# Patient Record
Sex: FEMALE | Race: BLACK OR AFRICAN AMERICAN | NOT HISPANIC OR LATINO | Employment: UNEMPLOYED | URBAN - METROPOLITAN AREA
[De-identification: names, ages, dates, MRNs, and addresses within clinical notes are randomized per-mention and may not be internally consistent; named-entity substitution may affect disease eponyms.]

---

## 2018-04-09 ENCOUNTER — HOSPITAL ENCOUNTER (OUTPATIENT)
Facility: HOSPITAL | Age: 23
Setting detail: OBSERVATION
Discharge: HOME | End: 2018-04-09
Attending: OBSTETRICS & GYNECOLOGY | Admitting: OBSTETRICS & GYNECOLOGY
Payer: COMMERCIAL

## 2018-04-09 VITALS
DIASTOLIC BLOOD PRESSURE: 77 MMHG | TEMPERATURE: 98.2 F | HEART RATE: 81 BPM | RESPIRATION RATE: 18 BRPM | SYSTOLIC BLOOD PRESSURE: 123 MMHG

## 2018-04-09 DIAGNOSIS — Z3A.39 39 WEEKS GESTATION OF PREGNANCY: Primary | ICD-10-CM

## 2018-04-09 PROBLEM — Z34.90 PREGNANCY: Status: ACTIVE | Noted: 2018-04-09

## 2018-04-09 PROCEDURE — 99218 PR INITIAL OBSERVATION CARE/DAY 30 MINUTES: CPT | Performed by: OBSTETRICS & GYNECOLOGY
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
## 2018-04-09 NOTE — DISCHARGE SUMMARY
Inpatient Discharge Summary    BRIEF OVERVIEW  Admitting Provider: Pauline Varner MD  Discharge Provider: Pauline Varner MD  Primary Care Physician at Discharge: No primary care provider on file. None    Admission Date: 4/9/2018     Discharge Date: 4/9/2018    Primary Discharge Diagnosis  39 weeks pregnant  Secondary Discharge Diagnosis  Not in labor, dehydration    Discharge Disposition  To Home  Discharge Medications     Medication List      No changes were made to your prescriptions during this visit.         Active Issues Requiring Follow-up  Issue: pregnancy  Responsible Individual: self  What is Needed: follow up appt this week  Follow-up Appointments Arranged: No     Outpatient Follow-Up  No future appointments.    Test Results Pending at Discharge      DETAILS OF HOSPITAL STAY    Presenting Problem/History of Present Illness  Pregnancy [Z34.90]  The patient presented with leg pain at 30-39 weeks pregnant.  She was not labor there was a reactive category 1 tracing and she was discharged home.    Hospital Course/Operative Procedures Performed    Consults: none  Procedures: none  Pertinent Test Results: reactive fetal tracing

## 2020-12-27 ENCOUNTER — APPOINTMENT (EMERGENCY)
Dept: RADIOLOGY | Facility: HOSPITAL | Age: 25
End: 2020-12-27

## 2020-12-27 ENCOUNTER — HOSPITAL ENCOUNTER (EMERGENCY)
Facility: HOSPITAL | Age: 25
Discharge: HOME | End: 2020-12-27
Attending: EMERGENCY MEDICINE

## 2020-12-27 VITALS
SYSTOLIC BLOOD PRESSURE: 150 MMHG | HEIGHT: 66 IN | HEART RATE: 83 BPM | DIASTOLIC BLOOD PRESSURE: 86 MMHG | OXYGEN SATURATION: 100 % | WEIGHT: 160 LBS | BODY MASS INDEX: 25.71 KG/M2 | RESPIRATION RATE: 18 BRPM | TEMPERATURE: 98.8 F
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
## 2020-12-27 DIAGNOSIS — M25.552 LEFT HIP PAIN: ICD-10-CM

## 2020-12-27 DIAGNOSIS — M25.561 ACUTE PAIN OF BOTH KNEES: ICD-10-CM

## 2020-12-27 DIAGNOSIS — T14.8XXA SKIN ABRASION: ICD-10-CM

## 2020-12-27 DIAGNOSIS — M25.531 RIGHT WRIST PAIN: ICD-10-CM

## 2020-12-27 DIAGNOSIS — M79.674 GREAT TOE PAIN, RIGHT: ICD-10-CM

## 2020-12-27 DIAGNOSIS — M25.562 ACUTE PAIN OF BOTH KNEES: ICD-10-CM

## 2020-12-27 PROCEDURE — 73564 X-RAY EXAM KNEE 4 OR MORE: CPT | Mod: 50

## 2020-12-27 PROCEDURE — 63700000 HC SELF-ADMINISTRABLE DRUG: Performed by: NURSE PRACTITIONER

## 2020-12-27 PROCEDURE — 73110 X-RAY EXAM OF WRIST: CPT | Mod: RT

## 2020-12-27 PROCEDURE — 73502 X-RAY EXAM HIP UNI 2-3 VIEWS: CPT | Mod: LT

## 2020-12-27 PROCEDURE — 73630 X-RAY EXAM OF FOOT: CPT | Mod: RT

## 2020-12-27 PROCEDURE — 99283 EMERGENCY DEPT VISIT LOW MDM: CPT | Mod: 25

## 2020-12-27 RX ORDER — IBUPROFEN 600 MG/1
600 TABLET ORAL ONCE
Status: COMPLETED | OUTPATIENT
Start: 2020-12-27 | End: 2020-12-27

## 2020-12-27 RX ORDER — ACETAMINOPHEN 325 MG/1
650 TABLET ORAL ONCE
Status: COMPLETED | OUTPATIENT
Start: 2020-12-27 | End: 2020-12-27

## 2020-12-27 RX ADMIN — IBUPROFEN 600 MG: 600 TABLET, FILM COATED ORAL at 15:51

## 2020-12-27 RX ADMIN — ACETAMINOPHEN 650 MG: 325 TABLET, FILM COATED ORAL at 14:11

## 2020-12-27 SDOH — HEALTH STABILITY: MENTAL HEALTH: HOW OFTEN DO YOU HAVE A DRINK CONTAINING ALCOHOL?: NEVER

## 2020-12-27 ASSESSMENT — ENCOUNTER SYMPTOMS
CONFUSION: 0
HEADACHES: 0
DIZZINESS: 0
SHORTNESS OF BREATH: 0
BRUISES/BLEEDS EASILY: 0
ABDOMINAL PAIN: 0
FEVER: 0
ARTHRALGIAS: 1
NAUSEA: 0
COUGH: 0
BACK PAIN: 0
VOMITING: 0
NECK PAIN: 0
WOUND: 1
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
## 2020-12-27 NOTE — DISCHARGE INSTRUCTIONS
Please ice any injured areas.  Wash your wounds with warm soap and water and pat dry.  You may apply antibiotic ointment on your wounds if desired.  There were no fractures noted on your imaging.  Please establish care with a family provider.  I gave you a list of family providers that are accepting new patients.    Please wear the postop shoe.  This will help provide comfort to your toe.  You may call the orthopedic doctor for follow-up of any joint pain.    Please take Tylenol for pain.    Expect to be more sore tomorrow as you rest overnight your muscles will tighten.  It is normal to have more aches and pains the next day.  Please follow-up with your family provider if you have any questions or concerns regarding these.    Please return to the emergency department with any new or worsening symptoms like fever, chest pain, shortness of breath, abnormal bruising or bleeding, abdominal pain, severe headaches or vomiting.      Please return immediately to the emergency department with any suicidal thoughts, safety concerns, home safety concerns, domestic safety concerns.

## 2021-07-12 ENCOUNTER — HOSPITAL ENCOUNTER (EMERGENCY)
Facility: HOSPITAL | Age: 26
Discharge: HOME | End: 2021-07-12
Attending: EMERGENCY MEDICINE

## 2021-07-12 ENCOUNTER — APPOINTMENT (EMERGENCY)
Dept: RADIOLOGY | Facility: HOSPITAL | Age: 26
End: 2021-07-12
Attending: EMERGENCY MEDICINE

## 2021-07-12 VITALS
OXYGEN SATURATION: 99 % | DIASTOLIC BLOOD PRESSURE: 86 MMHG | RESPIRATION RATE: 18 BRPM | WEIGHT: 170 LBS | HEART RATE: 68 BPM | SYSTOLIC BLOOD PRESSURE: 123 MMHG | TEMPERATURE: 98.2 F | HEIGHT: 66 IN | BODY MASS INDEX: 27.32 KG/M2

## 2021-07-12 DIAGNOSIS — T18.108A FOREIGN BODY IN ESOPHAGUS, INITIAL ENCOUNTER: Primary | ICD-10-CM

## 2021-07-12 PROCEDURE — 99283 EMERGENCY DEPT VISIT LOW MDM: CPT | Mod: 25

## 2021-07-12 PROCEDURE — 70360 X-RAY EXAM OF NECK: CPT

## 2021-07-12 PROCEDURE — 63700000 HC SELF-ADMINISTRABLE DRUG: Performed by: EMERGENCY MEDICINE

## 2021-07-12 PROCEDURE — 71045 X-RAY EXAM CHEST 1 VIEW: CPT

## 2021-07-12 PROCEDURE — 25000000 HC PHARMACY GENERAL: Performed by: EMERGENCY MEDICINE

## 2021-07-12 RX ORDER — LIDOCAINE HYDROCHLORIDE 20 MG/ML
10 SOLUTION OROPHARYNGEAL ONCE
Status: COMPLETED | OUTPATIENT
Start: 2021-07-12 | End: 2021-07-12

## 2021-07-12 RX ORDER — ALUMINUM HYDROXIDE, MAGNESIUM HYDROXIDE, AND SIMETHICONE 1200; 120; 1200 MG/30ML; MG/30ML; MG/30ML
30 SUSPENSION ORAL ONCE
Status: COMPLETED | OUTPATIENT
Start: 2021-07-12 | End: 2021-07-12

## 2021-07-12 RX ADMIN — ALUMINA, MAGNESIA, AND SIMETHICONE ORAL SUSPENSION REGULAR STRENGTH 30 ML: 1200; 1200; 120 SUSPENSION ORAL at 22:05

## 2021-07-12 RX ADMIN — LIDOCAINE HYDROCHLORIDE 10 ML: 20 SOLUTION ORAL; TOPICAL at 22:04

## 2021-07-12 ASSESSMENT — ENCOUNTER SYMPTOMS
ABDOMINAL PAIN: 0
BRUISES/BLEEDS EASILY: 0
COUGH: 0
COLOR CHANGE: 0
FATIGUE: 0
PALPITATIONS: 0
ARTHRALGIAS: 0
FREQUENCY: 0
SPEECH DIFFICULTY: 0
CHILLS: 0
NAUSEA: 0
RHINORRHEA: 0
HEMATURIA: 0
WEAKNESS: 0
CHEST TIGHTNESS: 0
NUMBNESS: 0
DIARRHEA: 0
WHEEZING: 0
EYE PAIN: 0
DYSURIA: 0
VOMITING: 0
SORE THROAT: 1
BACK PAIN: 0
SHORTNESS OF BREATH: 0
FEVER: 0

## 2021-07-13 NOTE — ED PROVIDER NOTES
Emergency Medicine Note  HPI   HISTORY OF PRESENT ILLNESS     26-year-old female presents with foreign body sensation.  Patient states she was eating in a restaurant swallowed [place] chicken meal.  Had discomfort with swallowing.  Looked through her food and saw metal from the wire brush for the grill and her chicken meat.  Patient presented for evaluation.      History provided by:  Patient   used: No    Swallowed Foreign Body  Location:  Oropharynx  Severity:  Mild  Onset quality:  Gradual  Timing:  Intermittent  Progression:  Waxing and waning  Chronicity:  New  Relieved by:  Nothing  Worsened by:  Swallowing  Associated symptoms: sore throat    Associated symptoms: no abdominal pain, no chest pain, no congestion, no cough, no diarrhea, no ear pain, no fatigue, no fever, no nausea, no rash, no rhinorrhea, no shortness of breath, no vomiting and no wheezing          Patient History   PAST HISTORY     Reviewed from Nursing Triage:      History reviewed. No pertinent past medical history.    History reviewed. No pertinent surgical history.    History reviewed. No pertinent family history.    Social History     Tobacco Use   • Smoking status: Never Smoker   • Smokeless tobacco: Never Used   Substance Use Topics   • Alcohol use: Never   • Drug use: Never         Review of Systems   REVIEW OF SYSTEMS     Review of Systems   Constitutional: Negative for chills, fatigue and fever.   HENT: Positive for sore throat. Negative for congestion, ear pain and rhinorrhea.    Eyes: Negative for pain and visual disturbance.   Respiratory: Negative for cough, chest tightness, shortness of breath and wheezing.    Cardiovascular: Negative for chest pain and palpitations.   Gastrointestinal: Negative for abdominal pain, diarrhea, nausea and vomiting.   Genitourinary: Negative for dysuria, frequency, hematuria, urgency, vaginal bleeding and vaginal discharge.   Musculoskeletal: Negative for arthralgias and back pain.    Skin: Negative for color change and rash.   Neurological: Negative for speech difficulty, weakness and numbness.   Hematological: Does not bruise/bleed easily.         VITALS     ED Vitals    Date/Time Temp Pulse Resp BP SpO2 [facility]   07/12/21 1917 36.8 °C (98.2 °F) 74 16 143/110 100 % JDG                       Physical Exam   PHYSICAL EXAM     Physical Exam  Vitals and nursing note reviewed.   Constitutional:       General: She is not in acute distress.     Appearance: She is well-developed.   HENT:      Head: Normocephalic and atraumatic.   Eyes:      Conjunctiva/sclera: Conjunctivae normal.   Neck:      Trachea: No tracheal deviation.   Cardiovascular:      Rate and Rhythm: Normal rate and regular rhythm.      Heart sounds: Normal heart sounds. No murmur heard.     Pulmonary:      Effort: Pulmonary effort is normal. No respiratory distress.      Breath sounds: Normal breath sounds.   Abdominal:      Palpations: Abdomen is soft.      Tenderness: There is no abdominal tenderness. There is no guarding or rebound.   Musculoskeletal:         General: Normal range of motion.      Cervical back: Neck supple.   Skin:     General: Skin is warm and dry.   Neurological:      Mental Status: She is alert.      GCS: GCS eye subscore is 4. GCS verbal subscore is 5. GCS motor subscore is 6.           PROCEDURES     Procedures     DATA     Results     None          Imaging Results          X-RAY CHEST 1 VIEW (Final result)  Result time 07/12/21 20:29:59    Final result                 Impression:    IMPRESSION:  No unexpected metallic or other radiopaque foreign body in the chest.             Narrative:    CLINICAL HISTORY: Foreign body, possibly swallowed metal bristle.    COMMENT: An upright PA view of the chest is submitted for interpretation.  There  are no prior studies for comparison.    There is no unexpected metallic or other radiopaque foreign body in the thorax  or visualized upper abdomen with the appearance of a metal  bristle.  The lungs  are clear bilaterally.  Lung volumes are normal.  There are no pleural  effusions.  No pneumothorax is seen.  Heart size is normal.  Cardiomediastinal  contours are satisfactory.  Regional osseous structures are unremarkable.                               X-RAY NECK SOFT TISSUE (Final result)  Result time 07/12/21 20:28:34    Final result                 Impression:    IMPRESSION:  No unexpected metallic or other radiopaque foreign body in the neck.             Narrative:    CLINICAL HISTORY: Evaluate for foreign body.  Possibly swallowed metal bristle.    COMMENT:    AP and lateral views of the neck soft tissues are submitted for interpretation.  There are no prior studies for comparison.    No metallic or other radiopaque foreign bodies identified in the neck soft  tissues.  The nasopharyngeal, oropharyngeal, and hypopharyngeal airways and the  visualized trachea are patent.  Prevertebral/retropharyngeal soft tissues are  normal in thickness.  There is nonspecific straightening of the cervical  lordosis.  Regional osseous structures are otherwise unremarkable.                                No orders to display       Scoring tools                                 ED Course & MDM   MDM / ED COURSE and CLINICAL IMPRESSIONS     MDM  Number of Diagnoses or Management Options  Diagnosis management comments: 26-year-old female with a foreign body sensation in her esophagus.  Patient feels some irritation when she swallows.  No persistent foreign body sensation.  X-ray soft tissue neck and chest negative for any retained foreign body.  Patient was reassured will discharge home will give follow-up with GI as an outpatient should symptoms worsen.       Amount and/or Complexity of Data Reviewed  Tests in the radiology section of CPT®: reviewed        ED Course as of Jul 12 2120   Mon Jul 12, 2021 2057 X-rays reviewed by radiology no foreign body.    [RL]      ED Course User Index  [RL] JESSICA Barrera,  MD         Clinical Impressions as of Jul 12 2120   Foreign body in esophagus, initial encounter            JESSICA Barrera MD  07/12/21 2056       JESSICA Barrera MD  07/12/21 2120

## 2021-11-24 ENCOUNTER — APPOINTMENT (EMERGENCY)
Dept: RADIOLOGY | Facility: HOSPITAL | Age: 26
End: 2021-11-24
Attending: EMERGENCY MEDICINE

## 2021-11-24 ENCOUNTER — HOSPITAL ENCOUNTER (EMERGENCY)
Facility: HOSPITAL | Age: 26
Discharge: LEFT BEFORE DISCHARGE | End: 2021-11-24
Attending: EMERGENCY MEDICINE

## 2021-11-24 VITALS
OXYGEN SATURATION: 100 % | SYSTOLIC BLOOD PRESSURE: 112 MMHG | RESPIRATION RATE: 18 BRPM | DIASTOLIC BLOOD PRESSURE: 78 MMHG | HEART RATE: 76 BPM | TEMPERATURE: 98.8 F

## 2021-11-24 DIAGNOSIS — Z34.90 EARLY STAGE OF PREGNANCY: Primary | ICD-10-CM

## 2021-11-24 DIAGNOSIS — O23.40 URINARY TRACT INFECTION IN MOTHER DURING PREGNANCY, ANTEPARTUM: ICD-10-CM

## 2021-11-24 LAB
ABO + RH BLD: NORMAL
ALBUMIN SERPL-MCNC: 4 G/DL (ref 3.4–5)
ALP SERPL-CCNC: 39 IU/L (ref 35–126)
ALT SERPL-CCNC: 10 IU/L (ref 11–54)
ANION GAP SERPL CALC-SCNC: 9 MEQ/L (ref 3–15)
AST SERPL-CCNC: 16 IU/L (ref 15–41)
B-HCG UR QL: POSITIVE
B-HCG UR QL: POSITIVE
BACTERIA URNS QL MICRO: ABNORMAL /HPF
BASOPHILS # BLD: 0.04 K/UL (ref 0.01–0.1)
BASOPHILS NFR BLD: 0.6 %
BILIRUB SERPL-MCNC: 0.4 MG/DL (ref 0.3–1.2)
BILIRUB UR QL STRIP.AUTO: NEGATIVE MG/DL
BLD GP AB SCN SERPL QL: NEGATIVE
BUN SERPL-MCNC: 11 MG/DL (ref 8–20)
CALCIUM SERPL-MCNC: 9.4 MG/DL (ref 8.9–10.3)
CHLORIDE SERPL-SCNC: 100 MEQ/L (ref 98–109)
CLARITY UR REFRACT.AUTO: ABNORMAL
CO2 SERPL-SCNC: 24 MEQ/L (ref 22–32)
COLOR UR AUTO: YELLOW
CREAT SERPL-MCNC: 0.6 MG/DL (ref 0.6–1.1)
D AG BLD QL: POSITIVE
DIFFERENTIAL METHOD BLD: NORMAL
EOSINOPHIL # BLD: 0.06 K/UL (ref 0.04–0.36)
EOSINOPHIL NFR BLD: 0.9 %
ERYTHROCYTE [DISTWIDTH] IN BLOOD BY AUTOMATED COUNT: 12.9 % (ref 11.7–14.4)
GFR SERPL CREATININE-BSD FRML MDRD: >60 ML/MIN/1.73M*2
GLUCOSE SERPL-MCNC: 89 MG/DL (ref 70–99)
GLUCOSE UR STRIP.AUTO-MCNC: NEGATIVE MG/DL
HCG SERPL-ACNC: ABNORMAL IU/L (MIU/ML)
HCT VFR BLDCO AUTO: 38.5 % (ref 35–45)
HGB BLD-MCNC: 12.5 G/DL (ref 11.8–15.7)
HGB UR QL STRIP.AUTO: NEGATIVE
HYALINE CASTS #/AREA URNS LPF: ABNORMAL /LPF
IMM GRANULOCYTES # BLD AUTO: 0.02 K/UL (ref 0–0.08)
IMM GRANULOCYTES NFR BLD AUTO: 0.3 %
KETONES UR STRIP.AUTO-MCNC: ABNORMAL MG/DL
LABORATORY COMMENT REPORT: NORMAL
LEUKOCYTE ESTERASE UR QL STRIP.AUTO: 1
LYMPHOCYTES # BLD: 3.28 K/UL (ref 1.2–3.5)
LYMPHOCYTES NFR BLD: 48 %
MCH RBC QN AUTO: 28.7 PG (ref 28–33.2)
MCHC RBC AUTO-ENTMCNC: 32.5 G/DL (ref 32.2–35.5)
MCV RBC AUTO: 88.3 FL (ref 83–98)
MONOCYTES # BLD: 0.57 K/UL (ref 0.28–0.8)
MONOCYTES NFR BLD: 8.3 %
NEUTROPHILS # BLD: 2.87 K/UL (ref 1.7–7)
NEUTS SEG NFR BLD: 41.9 %
NITRITE UR QL STRIP.AUTO: NEGATIVE
NRBC BLD-RTO: 0 %
PDW BLD AUTO: 10.6 FL (ref 9.4–12.3)
PH UR STRIP.AUTO: 7 [PH]
PLATELET # BLD AUTO: 297 K/UL (ref 150–369)
POCT TEST: ABNORMAL
POCT TEST: ABNORMAL
POTASSIUM SERPL-SCNC: 4.5 MEQ/L (ref 3.6–5.1)
PROT SERPL-MCNC: 7.7 G/DL (ref 6–8.2)
PROT UR QL STRIP.AUTO: NEGATIVE
RBC # BLD AUTO: 4.36 M/UL (ref 3.93–5.22)
RBC #/AREA URNS HPF: ABNORMAL /HPF
SODIUM SERPL-SCNC: 133 MEQ/L (ref 136–144)
SP GR UR REFRACT.AUTO: 1.02
SPECIMEN EXP DATE BLD: NORMAL
SQUAMOUS URNS QL MICRO: 2 /HPF
UROBILINOGEN UR STRIP-ACNC: 1 EU/DL
WBC # BLD AUTO: 6.84 K/UL (ref 3.8–10.5)
WBC #/AREA URNS HPF: ABNORMAL /HPF

## 2021-11-24 PROCEDURE — 85025 COMPLETE CBC W/AUTO DIFF WBC: CPT | Performed by: PHYSICIAN ASSISTANT

## 2021-11-24 PROCEDURE — 99284 EMERGENCY DEPT VISIT MOD MDM: CPT

## 2021-11-24 PROCEDURE — G1004 CDSM NDSC: HCPCS

## 2021-11-24 PROCEDURE — 80053 COMPREHEN METABOLIC PANEL: CPT | Performed by: PHYSICIAN ASSISTANT

## 2021-11-24 PROCEDURE — 84702 CHORIONIC GONADOTROPIN TEST: CPT | Performed by: PHYSICIAN ASSISTANT

## 2021-11-24 PROCEDURE — 87086 URINE CULTURE/COLONY COUNT: CPT

## 2021-11-24 PROCEDURE — 36415 COLL VENOUS BLD VENIPUNCTURE: CPT | Performed by: PHYSICIAN ASSISTANT

## 2021-11-24 PROCEDURE — 81001 URINALYSIS AUTO W/SCOPE: CPT

## 2021-11-24 PROCEDURE — 86900 BLOOD TYPING SEROLOGIC ABO: CPT

## 2021-11-24 PROCEDURE — 63700000 HC SELF-ADMINISTRABLE DRUG: Performed by: PHYSICIAN ASSISTANT

## 2021-11-24 PROCEDURE — 93005 ELECTROCARDIOGRAM TRACING: CPT | Performed by: PHYSICIAN ASSISTANT

## 2021-11-24 PROCEDURE — 3E033GC INTRODUCTION OF OTHER THERAPEUTIC SUBSTANCE INTO PERIPHERAL VEIN, PERCUTANEOUS APPROACH: ICD-10-PCS | Performed by: EMERGENCY MEDICINE

## 2021-11-24 RX ORDER — ONDANSETRON 4 MG/1
4 TABLET, ORALLY DISINTEGRATING ORAL EVERY 8 HOURS PRN
Qty: 12 TABLET | Refills: 0 | Status: SHIPPED | OUTPATIENT
Start: 2021-11-24 | End: 2022-03-03

## 2021-11-24 RX ORDER — DIPHENHYDRAMINE HCL 50 MG/ML
12.5 VIAL (ML) INJECTION ONCE
Status: DISCONTINUED | OUTPATIENT
Start: 2021-11-24 | End: 2021-11-24 | Stop reason: HOSPADM

## 2021-11-24 RX ORDER — METOCLOPRAMIDE HYDROCHLORIDE 5 MG/ML
10 INJECTION INTRAMUSCULAR; INTRAVENOUS ONCE
Status: DISCONTINUED | OUTPATIENT
Start: 2021-11-24 | End: 2021-11-24 | Stop reason: HOSPADM

## 2021-11-24 RX ORDER — ACETAMINOPHEN 325 MG/1
650 TABLET ORAL ONCE
Status: COMPLETED | OUTPATIENT
Start: 2021-11-24 | End: 2021-11-24

## 2021-11-24 RX ORDER — CEPHALEXIN 500 MG/1
500 CAPSULE ORAL 2 TIMES DAILY
Qty: 14 CAPSULE | Refills: 0 | Status: SHIPPED | OUTPATIENT
Start: 2021-11-24 | End: 2021-12-01

## 2021-11-24 RX ADMIN — ACETAMINOPHEN 650 MG: 325 TABLET ORAL at 20:40

## 2021-11-24 ASSESSMENT — ENCOUNTER SYMPTOMS
LIGHT-HEADEDNESS: 0
DIARRHEA: 0
CONFUSION: 0
FEVER: 0
NUMBNESS: 0
COLOR CHANGE: 0
DECREASED CONCENTRATION: 0
SORE THROAT: 0
SHORTNESS OF BREATH: 0
NAUSEA: 1
FACIAL SWELLING: 0
ABDOMINAL DISTENTION: 0
BRUISES/BLEEDS EASILY: 0
BACK PAIN: 0
SINUS PRESSURE: 0
COUGH: 0
FLANK PAIN: 0
STRIDOR: 0
WEAKNESS: 0
PHOTOPHOBIA: 1
CHEST TIGHTNESS: 0
CHILLS: 0
DIZZINESS: 1
PALPITATIONS: 0
EYE PAIN: 1
FREQUENCY: 0
TREMORS: 0
WHEEZING: 0
FATIGUE: 0
HEMATURIA: 0
JOINT SWELLING: 0
HEADACHES: 1
SINUS PAIN: 0
ABDOMINAL PAIN: 1
DIAPHORESIS: 0
ARTHRALGIAS: 0
DYSURIA: 0
DIFFICULTY URINATING: 0
RHINORRHEA: 0
MYALGIAS: 0
VOMITING: 0

## 2021-11-24 ASSESSMENT — VISUAL ACUITY: OU: 1

## 2021-11-25 LAB
ATRIAL RATE: 77
P AXIS: 62
PR INTERVAL: 158
QRS DURATION: 72
QT INTERVAL: 362
QTC CALCULATION(BAZETT): 409
R AXIS: 34
T WAVE AXIS: 44
VENTRICULAR RATE: 77

## 2021-11-25 NOTE — ED ATTESTATION NOTE
The patient was evaluated and managed by the physician assistant / nurse practitioner.     Brandan Interiano MD  11/24/21 0599

## 2021-11-25 NOTE — DISCHARGE INSTRUCTIONS
Keflex as prescribed   Zofran as prescribed as needed for nausea   Increase fluids, rest  OTC tylenol as needed  Follow up with PCP in 1-2 days  Follow up with OBGYN in 24 hours  Return to the ED for any increased pain, fevers, increased vomiting or diarrhea, unable to eat or drink, unable to urinate, or any worsening of symptoms. Follow up with your healthcare provider for re-evaluation.      Leaving Against Medical Advice    You are leaving the emergency department / hospital against medical advice.  The emergency medicine provider has recommended you stay for further evaluation, treatment, and monitoring.  As a result of your leaving, there may be unforeseen circumstances and/or deterioration causing you permanent bodily harm or death.  Please follow-up with your doctor for re-evaluation as soon as possible as well as any other physicians listed in your instructions.  Return to the emergency department immediately if you change your mind, your condition begins to change or worsen, or any other problems or concerns.

## 2021-11-25 NOTE — ED PROVIDER NOTES
"Emergency Medicine Note  HPI   HISTORY OF PRESENT ILLNESS     Patient is a pleasant 27 y/o female without significant PMH who presents c/o headache, dizziness, blurred vision, nausea, abdominal pain x several days  Notes she had 2 teeth extracted on Monday (11/22) in [place] at her dentist   States she had general anesthesia for this procedure  Notes upon waking, approx 1 hour later, diffuse headache  States headache is dull, aching, throbbing  States \"I can feel it when I even blink\"   Notes photophobia  States symptoms have been constant   States associated dizziness, nausea   Notes some diffuse abdominal pain as well   She denies fever, chills, sweats, diarrhea, chest pain, SOB  She denies neck pain, numbness/ tingling/ weakness in extremities  She denies any other complaints or concerns at this time             Patient History   PAST HISTORY     Reviewed from Nursing Triage:      History reviewed. No pertinent past medical history.    Past Surgical History:   Procedure Laterality Date   • COMBINED AUGMENTATION MAMMAPLASTY AND ABDOMINOPLASTY         History reviewed. No pertinent family history.    Social History     Tobacco Use   • Smoking status: Never Smoker   • Smokeless tobacco: Never Used   Substance Use Topics   • Alcohol use: Never   • Drug use: Never         Review of Systems   REVIEW OF SYSTEMS     Review of Systems   Constitutional: Negative for chills, diaphoresis, fatigue and fever.   HENT: Negative for congestion, dental problem, ear pain, facial swelling, postnasal drip, rhinorrhea, sinus pressure, sinus pain and sore throat.    Eyes: Positive for photophobia, pain and visual disturbance.   Respiratory: Negative for cough, chest tightness, shortness of breath, wheezing and stridor.    Cardiovascular: Negative for chest pain, palpitations and leg swelling.   Gastrointestinal: Positive for abdominal pain and nausea. Negative for abdominal distention, diarrhea and vomiting.   Genitourinary: " Negative for decreased urine volume, difficulty urinating, dysuria, flank pain, frequency, hematuria, pelvic pain and urgency.   Musculoskeletal: Negative for arthralgias, back pain, gait problem, joint swelling and myalgias.   Skin: Negative for color change and rash.   Neurological: Positive for dizziness and headaches. Negative for tremors, syncope, weakness, light-headedness and numbness.   Hematological: Does not bruise/bleed easily.   Psychiatric/Behavioral: Negative for confusion and decreased concentration.         VITALS     ED Vitals    Date/Time Temp Pulse Resp BP SpO2 [facility]   11/24/21 2100 -- -- 18 112/78 100 % [facility]   11/24/21 1943 -- 76 18 112/75 100 % [facility]   11/24/21 1649 37.1 °C (98.8 °F) 79 18 112/60 100 % [facility]        Pulse Ox %: 100 % (11/24/21 1952)  Pulse Ox Interpretation: Normal (11/24/21 1952)  Heart Rate: 77 (11/24/21 1952)  Rhythm Strip Interpretation: Normal Sinus Rhythm (11/24/21 1952)     Physical Exam   PHYSICAL EXAM     Physical Exam  Vitals and nursing note reviewed.   Constitutional:       General: She is awake. She is not in acute distress.     Appearance: Normal appearance. She is well-developed, well-groomed and normal weight. She is not ill-appearing, toxic-appearing or diaphoretic.   HENT:      Head: Normocephalic and atraumatic. No right periorbital erythema or left periorbital erythema.      Jaw: There is normal jaw occlusion. No trismus, tenderness, swelling, pain on movement or malocclusion.      Mouth/Throat:      Lips: Pink.      Mouth: Mucous membranes are moist.      Dentition: No gingival swelling, dental abscesses or gum lesions.      Pharynx: Oropharynx is clear. Uvula midline. No pharyngeal swelling, oropharyngeal exudate, posterior oropharyngeal erythema or uvula swelling.      Comments: Braces noted   Dental implant post noted tooth #9  No gingiva erythema, edema  No areas of fluctuance   No trismus  Patient tolerating secretions, speaking in complete sentences   Eyes:       General: Lids are normal. Vision grossly intact. Gaze aligned appropriately. No scleral icterus.     Extraocular Movements: Extraocular movements intact.      Conjunctiva/sclera: Conjunctivae normal.      Pupils: Pupils are equal, round, and reactive to light.   Neck:      Trachea: Trachea and phonation normal. No abnormal tracheal secretions or tracheal deviation.   Cardiovascular:      Rate and Rhythm: Normal rate and regular rhythm.      Heart sounds: Normal heart sounds.   Pulmonary:      Effort: Pulmonary effort is normal. No respiratory distress.      Breath sounds: Normal breath sounds.   Abdominal:      General: Abdomen is flat. Bowel sounds are normal.      Palpations: Abdomen is soft.      Tenderness: There is no abdominal tenderness.   Musculoskeletal:      Cervical back: Normal range of motion and neck supple.   Skin:     General: Skin is warm and dry.      Capillary Refill: Capillary refill takes less than 2 seconds.   Neurological:      General: No focal deficit present.      Mental Status: She is alert and oriented to person, place, and time.      GCS: GCS eye subscore is 4. GCS verbal subscore is 5. GCS motor subscore is 6.      Cranial Nerves: Cranial nerves are intact. No cranial nerve deficit.      Motor: Motor function is intact. No weakness, tremor or abnormal muscle tone.      Coordination: Coordination is intact. Coordination normal.      Gait: Gait is intact. Gait normal.   Psychiatric:         Mood and Affect: Mood normal. Mood is not anxious or depressed.         Behavior: Behavior normal. Behavior is cooperative.           PROCEDURES     Procedures     DATA     Results     Procedure Component Value Units Date/Time    CG, Serum, Quant [[id number]]  (Abnormal) Collected: 11/24/21 1936    Specimen: Blood, Venous Updated: 11/24/21 2111     hCG Quant 11,181.0 IU/L (mIU/mL)      Comment: Approx. Gestational Age   Approx. hCG Range         (Weeks)                  (IU/L)          0.2-1                     5-50            1-2                               2-3                  100-5000            3-4                  500-10,000            4-5                 1000-50,000            5-6               10,000-100,000            6-8               15,000-200,000            8-12              10,000-100,000       Comprehensive metabolic panel [[id number]]  (Abnormal) Collected: 11/24/21 1936    Specimen: Blood, Venous Updated: 11/24/21 2109     Sodium 133 mEQ/L      Potassium 4.5 mEQ/L      Comment: Results obtained on plasma. Plasma Potassium values may be up to 0.4 mEQ/L less than serum values. The differences may be greater for patients with high platelet or white cell counts.        Chloride 100 mEQ/L      CO2 24 mEQ/L      BUN 11 mg/dL      Creatinine 0.6 mg/dL      Glucose 89 mg/dL      Calcium 9.4 mg/dL      AST (SGOT) 16 IU/L      ALT (SGPT) 10 IU/L      Alkaline Phosphatase 39 IU/L      Total Protein 7.7 g/dL      Comment: Test performed on plasma which typically contains approximately 0.4 g/dL more protein than serum.        Albumin 4.0 g/dL      Bilirubin, Total 0.4 mg/dL      eGFR >60.0 mL/min/1.73m*2      Anion Gap 9 mEQ/L     Type and screen [[id number]] Collected: 11/24/21 1936    Specimen: Blood, Venous Updated: 11/24/21 2101     Specimen Expiration 11/27/2021     Antibody Screen Negative     ABO O     Rh Factor Positive     History Check No type on file    CBC and differential [[id number]] Collected: 11/24/21 1936    Specimen: Blood, Venous Updated: 11/24/21 2001     WBC 6.84 K/uL      RBC 4.36 M/uL      Hemoglobin 12.5 g/dL      Hematocrit 38.5 %      MCV 88.3 fL      MCH 28.7 pg      MCHC 32.5 g/dL      RDW 12.9 %      Platelets 297 K/uL      MPV 10.6 fL      Differential Type Auto     nRBC 0.0 %      Immature Granulocytes 0.3 %      Neutrophils 41.9 %      Lymphocytes 48.0 %      Monocytes 8.3 %      Eosinophils 0.9 %      Basophils 0.6 %      Immature Granulocytes, Absolute 0.02 K/uL       Neutrophils, Absolute 2.87 K/uL      Lymphocytes, Absolute 3.28 K/uL      Monocytes, Absolute 0.57 K/uL      Eosinophils, Absolute 0.06 K/uL      Basophils, Absolute 0.04 K/uL     Urinalysis with Reflex Culture [[id number]]  (Abnormal) Collected: 11/24/21 1652    Specimen: Urine, Clean Catch Updated: 11/24/21 1728    Narrative:      The following orders were created for panel order Urinalysis with Reflex Culture.  Procedure                               Abnormality         Status                     ---------                               -----------         ------                     UA Reflex to Culture (Ma...[[id number]]  Abnormal            Final result               UA Microscopic[[id number]]               Abnormal            Final result                 Please view results for these tests on the individual orders.    UA Microscopic [[id number]]  (Abnormal) Collected: 11/24/21 1652    Specimen: Urine, Clean Catch Updated: 11/24/21 1728     RBC, Urine 0 TO 4 /HPF      WBC, Urine 0 TO 3 /HPF      Squamous Epithelial +2 /hpf      Hyaline Cast None Seen /lpf      Bacteria, Urine Rare /HPF     UA Reflex to Culture (Macroscopic) [[id number]]  (Abnormal) Collected: 11/24/21 1652    Specimen: Urine, Clean Catch Updated: 11/24/21 1717     Color, Urine Yellow     Clarity, Urine Cloudy     Specific Gravity, Urine 1.024     pH, Urine 7.0     Leukocyte Esterase +1     Nitrite, Urine Negative     Protein, Urine Negative     Glucose, Urine Negative mg/dL      Ketones, Urine Trace mg/dL      Comment: Free sulfhydryl drugs such as Mesna, Capoten, and Acetylcysteine (Mucomyst) may cause false positive ketonuria.        Urobilinogen, Urine 1.0 EU/dL      Bilirubin, Urine Negative mg/dL      Blood, Urine Negative          Imaging Results          CT HEAD WITHOUT IV CONTRAST (In process)                 ECG 12 lead             Scoring tools                                 ED Course & MDM   MDM / ED COURSE and CLINICAL IMPRESSIONS      MDM    ED Course as of 21 POCT BhCG, Urine Qual(!): Positive  Noted  [CL]    Leukocyte Esterase(!): +1 [CL]    Nitrite, Urine: Negative [CL]    Temp: 37.1 °C (98.8 °F) [CL]    Patient states LMP possibly last month - is unsure and notes she is irregular. States , no OBGYN currently  [CL]    Patient declined IV medications at this time [CL]    Patient requesting IV be removed  [CL]    Patient states she needs to leave ASAP to  her 3 year old, will try to get her to [place] ASAP. Patient agreeable to leave AMA if needed  [CL]    Patient states she needs to leave to get her child. States she cannot stay for US, or CT results. Labs reviewed. Dr. Interiano aware. AMA paperwork completed [CL]    This patient is choosing to leave against medical advice.  The emergency provider (EP) has personally explained to her that choosing to do so may result in permanent bodily harm or death.  The EP discussed at great length that without further evaluation and monitoring there may be unforeseen circumstances and/or deterioration causing permanent bodily harm or death as a result of her choice.  She verbalized these risks back to the physician in layman's terms.  She is alert, oriented, and shows the mental capacity to make clear decisions regarding her health care at this time. She continues to wish to leave against medical advice.    In light of her decision to leave AMA, follow-up has been advised and she is aware of the importance of following up as instructed.  She has been advised that she should return to the ED immediately if she changes her mind at any time, or if her condition begins to change or worsen in any way. Discharge instructions were provided to the patient. [CL]      ED Course User Index  [CL] Barbara Krause PA C         Clinical Impressions as of 21   Urinary tract infection in mother during pregnancy, antepartum   Early  stage of pregnancy            Link, THERESA Allen  11/24/21 2129

## 2021-11-26 LAB — BACTERIA UR CULT: NORMAL

## 2022-03-03 PROCEDURE — 99283 EMERGENCY DEPT VISIT LOW MDM: CPT | Mod: 25

## 2022-03-04 ENCOUNTER — HOSPITAL ENCOUNTER (EMERGENCY)
Facility: HOSPITAL | Age: 27
Discharge: HOME | End: 2022-03-04
Attending: EMERGENCY MEDICINE

## 2022-03-04 VITALS
DIASTOLIC BLOOD PRESSURE: 68 MMHG | BODY MASS INDEX: 27.32 KG/M2 | RESPIRATION RATE: 18 BRPM | OXYGEN SATURATION: 99 % | HEIGHT: 66 IN | SYSTOLIC BLOOD PRESSURE: 90 MMHG | WEIGHT: 170 LBS | TEMPERATURE: 98.6 F | HEART RATE: 74 BPM

## 2022-03-04 DIAGNOSIS — B96.89 BACTERIAL VAGINOSIS IN PREGNANCY: Primary | ICD-10-CM

## 2022-03-04 DIAGNOSIS — O23.599 BACTERIAL VAGINOSIS IN PREGNANCY: Primary | ICD-10-CM

## 2022-03-04 LAB
ALBUMIN SERPL-MCNC: 3.9 G/DL (ref 3.4–5)
ALP SERPL-CCNC: 36 IU/L (ref 35–126)
ALT SERPL-CCNC: 12 IU/L (ref 11–54)
ANION GAP SERPL CALC-SCNC: 7 MEQ/L (ref 3–15)
AST SERPL-CCNC: 14 IU/L (ref 15–41)
B-HCG UR QL: POSITIVE
BACTERIA URNS QL MICRO: ABNORMAL /HPF
BASOPHILS # BLD: 0.03 K/UL (ref 0.01–0.1)
BASOPHILS NFR BLD: 0.5 %
BILIRUB SERPL-MCNC: 0.6 MG/DL (ref 0.3–1.2)
BILIRUB UR QL STRIP.AUTO: NEGATIVE MG/DL
BUN SERPL-MCNC: 11 MG/DL (ref 8–20)
BV WHIFF TEST VAG QL: ABNORMAL
C TRACH RRNA SPEC QL NAA+PROBE: NEGATIVE
CALCIUM SERPL-MCNC: 8.8 MG/DL (ref 8.9–10.3)
CHLORIDE SERPL-SCNC: 105 MEQ/L (ref 98–109)
CLARITY UR REFRACT.AUTO: CLEAR
CLUE CELLS SPEC QL WET PREP: ABNORMAL
CO2 SERPL-SCNC: 20 MEQ/L (ref 22–32)
COLOR UR AUTO: YELLOW
CREAT SERPL-MCNC: 0.5 MG/DL (ref 0.6–1.1)
DIFFERENTIAL METHOD BLD: NORMAL
EOSINOPHIL # BLD: 0.09 K/UL (ref 0.04–0.36)
EOSINOPHIL NFR BLD: 1.5 %
ERYTHROCYTE [DISTWIDTH] IN BLOOD BY AUTOMATED COUNT: 12.7 % (ref 11.7–14.4)
GFR SERPL CREATININE-BSD FRML MDRD: >60 ML/MIN/1.73M*2
GLUCOSE SERPL-MCNC: 97 MG/DL (ref 70–99)
GLUCOSE UR STRIP.AUTO-MCNC: NEGATIVE MG/DL
HCG SERPL-ACNC: 2979 IU/L (MIU/ML)
HCT VFR BLDCO AUTO: 36.2 % (ref 35–45)
HGB BLD-MCNC: 11.9 G/DL (ref 11.8–15.7)
HGB UR QL STRIP.AUTO: NEGATIVE
HYALINE CASTS #/AREA URNS LPF: ABNORMAL /LPF
IMM GRANULOCYTES # BLD AUTO: 0 K/UL (ref 0–0.08)
IMM GRANULOCYTES NFR BLD AUTO: 0 %
KETONES UR STRIP.AUTO-MCNC: NEGATIVE MG/DL
LEUKOCYTE ESTERASE UR QL STRIP.AUTO: NEGATIVE
LYMPHOCYTES # BLD: 3.41 K/UL (ref 1.2–3.5)
LYMPHOCYTES NFR BLD: 56.6 %
MCH RBC QN AUTO: 28.8 PG (ref 28–33.2)
MCHC RBC AUTO-ENTMCNC: 32.9 G/DL (ref 32.2–35.5)
MCV RBC AUTO: 87.7 FL (ref 83–98)
MONOCYTES # BLD: 0.48 K/UL (ref 0.28–0.8)
MONOCYTES NFR BLD: 8 %
MUCOUS THREADS URNS QL MICRO: ABNORMAL /LPF
N GONORRHOEA RRNA SPEC QL NAA+PROBE: NEGATIVE
NEUTROPHILS # BLD: 2.01 K/UL (ref 1.7–7)
NEUTS SEG NFR BLD: 33.4 %
NITRITE UR QL STRIP.AUTO: NEGATIVE
NRBC BLD-RTO: 0 %
PDW BLD AUTO: 10.5 FL (ref 9.4–12.3)
PH UR STRIP.AUTO: 6.5 [PH]
PLATELET # BLD AUTO: 244 K/UL (ref 150–369)
POCT TEST: ABNORMAL
POTASSIUM SERPL-SCNC: 3.7 MEQ/L (ref 3.6–5.1)
PROT SERPL-MCNC: 7.4 G/DL (ref 6–8.2)
PROT UR QL STRIP.AUTO: ABNORMAL
RBC # BLD AUTO: 4.13 M/UL (ref 3.93–5.22)
RBC #/AREA URNS HPF: ABNORMAL /HPF
SODIUM SERPL-SCNC: 132 MEQ/L (ref 136–144)
SP GR UR REFRACT.AUTO: 1.03
SQUAMOUS URNS QL MICRO: ABNORMAL /HPF
T VAGINALIS GENITAL QL WET PREP: ABNORMAL
UROBILINOGEN UR STRIP-ACNC: 2 EU/DL
WBC # BLD AUTO: 6.02 K/UL (ref 3.8–10.5)
WBC #/AREA URNS HPF: ABNORMAL /HPF
WBC VAG QL WET PREP: ABNORMAL
YEAST VAG QL WET PREP: ABNORMAL

## 2022-03-04 PROCEDURE — 87210 SMEAR WET MOUNT SALINE/INK: CPT | Performed by: EMERGENCY MEDICINE

## 2022-03-04 PROCEDURE — 84702 CHORIONIC GONADOTROPIN TEST: CPT | Performed by: EMERGENCY MEDICINE

## 2022-03-04 PROCEDURE — 36415 COLL VENOUS BLD VENIPUNCTURE: CPT | Performed by: EMERGENCY MEDICINE

## 2022-03-04 PROCEDURE — 80053 COMPREHEN METABOLIC PANEL: CPT | Performed by: EMERGENCY MEDICINE

## 2022-03-04 PROCEDURE — 63700000 HC SELF-ADMINISTRABLE DRUG: Performed by: EMERGENCY MEDICINE

## 2022-03-04 PROCEDURE — 81001 URINALYSIS AUTO W/SCOPE: CPT | Performed by: EMERGENCY MEDICINE

## 2022-03-04 PROCEDURE — 85025 COMPLETE CBC W/AUTO DIFF WBC: CPT | Performed by: EMERGENCY MEDICINE

## 2022-03-04 PROCEDURE — 87591 N.GONORRHOEAE DNA AMP PROB: CPT | Performed by: EMERGENCY MEDICINE

## 2022-03-04 PROCEDURE — 81003 URINALYSIS AUTO W/O SCOPE: CPT | Performed by: EMERGENCY MEDICINE

## 2022-03-04 RX ORDER — METRONIDAZOLE 500 MG/1
500 TABLET ORAL ONCE
Status: COMPLETED | OUTPATIENT
Start: 2022-03-04 | End: 2022-03-04

## 2022-03-04 RX ORDER — METRONIDAZOLE 500 MG/1
500 TABLET ORAL 2 TIMES DAILY
Qty: 14 TABLET | Refills: 0 | Status: SHIPPED | OUTPATIENT
Start: 2022-03-04 | End: 2022-03-11

## 2022-03-04 RX ADMIN — METRONIDAZOLE 500 MG: 500 TABLET ORAL at 04:43

## 2022-03-04 ASSESSMENT — ENCOUNTER SYMPTOMS
CONSTIPATION: 0
DIARRHEA: 0
DYSURIA: 0
LIGHT-HEADEDNESS: 0
VOMITING: 0
COUGH: 0
RHINORRHEA: 0
DIFFICULTY URINATING: 0
HEADACHES: 0
DIZZINESS: 0
FEVER: 0
FATIGUE: 0
CONFUSION: 0
ABDOMINAL PAIN: 1
CHILLS: 0
SHORTNESS OF BREATH: 0
EYE PAIN: 0
ARTHRALGIAS: 0
NECK PAIN: 0
BACK PAIN: 0
WEAKNESS: 0
NAUSEA: 0
FREQUENCY: 0

## 2022-03-04 NOTE — DISCHARGE INSTRUCTIONS
Take antibiotics as prescribed  Follow with your regular doctor/ob-gyn  Return for worsening or other concerns.

## 2022-03-04 NOTE — ED PROVIDER NOTES
Emergency Medicine Note  HPI   HISTORY OF PRESENT ILLNESS     Pt here complaining of pain in her lower abdomen  - started Wednesday - comes and goes.  Described as a cramping and burning type pain.  Pt denies associated symptoms.  She does report vaginal discharge  - grayish-white.  Pt denies itching or burning.  Pt has had BV off and on in the past- last treated a few months ago.  Pt denies nausea and vomiting.  No trouble with BM.  FDLMP 22.   Currently pregnant.  ;  Pt had no complications with pregnancy.  Pt does not have an Ob or a scheduled appointment.                  Patient History   PAST HISTORY     Reviewed from Nursing Triage:       No past medical history on file.    Past Surgical History:   Procedure Laterality Date   • COMBINED AUGMENTATION MAMMAPLASTY AND ABDOMINOPLASTY         No family history on file.    Social History     Tobacco Use   • Smoking status: Never Smoker   • Smokeless tobacco: Never Used   Substance Use Topics   • Alcohol use: Never   • Drug use: Never         Review of Systems   REVIEW OF SYSTEMS     Review of Systems   Constitutional: Negative for chills, fatigue and fever.   HENT: Negative for congestion, rhinorrhea and sneezing.    Eyes: Negative for pain.   Respiratory: Negative for cough and shortness of breath.    Cardiovascular: Negative for chest pain and leg swelling.   Gastrointestinal: Positive for abdominal pain. Negative for constipation, diarrhea, nausea and vomiting.   Genitourinary: Positive for vaginal discharge. Negative for difficulty urinating, dysuria and frequency.   Musculoskeletal: Negative for arthralgias, back pain and neck pain.   Skin: Negative for rash.   Neurological: Negative for dizziness, weakness, light-headedness and headaches.   Psychiatric/Behavioral: Negative for confusion.         VITALS     ED Vitals    Date/Time Temp Pulse Resp BP SpO2 [facility]   22 0446 -- -- 18 90/68 99 % [facility]   22 0214 -- -- -- -- 100 % [facility]   22  -- -- 15 110/67 100 % [facility]   03/03/22 2205 37 °C (98.6 °F) 74 16 117/64 100 % [facility]        Pulse Ox %: 100 % (03/04/22 0243)  Pulse Ox Interpretation: Normal (03/04/22 0243)  Heart Rate: 74 (03/04/22 0243)        Physical Exam   PHYSICAL EXAM     Physical Exam  Vitals and nursing note reviewed.   Constitutional:       Appearance: She is well-developed.   HENT:      Head: Normocephalic.      Nose: Nose normal.   Eyes:      Conjunctiva/sclera: Conjunctivae normal.      Pupils: Pupils are equal, round, and reactive to light.   Cardiovascular:      Rate and Rhythm: Normal rate and regular rhythm.      Heart sounds: Normal heart sounds.   Pulmonary:      Effort: Pulmonary effort is normal.      Breath sounds: Normal breath sounds.   Abdominal:      General: Bowel sounds are normal.      Palpations: Abdomen is soft.   Genitourinary:     Vagina: Normal.      Cervix: Discharge present.   Musculoskeletal:         General: Normal range of motion.      Cervical back: Normal range of motion and neck supple.   Lymphadenopathy:      Cervical: No cervical adenopathy.   Skin:     General: Skin is warm and dry.      Capillary Refill: Capillary refill takes less than 2 seconds.   Neurological:      General: No focal deficit present.      Mental Status: She is alert and oriented to person, place, and time.      Sensory: No sensory deficit.   Psychiatric:         Mood and Affect: Mood normal.         Behavior: Behavior normal.           PROCEDURES     Procedures     DATA     Results     Procedure Component Value Units Date/Time    BhCG, Serum, Quant [[id number]]  (Abnormal) Collected: 03/04/22 0320    Specimen: Blood, Venous Updated: 03/04/22 0535     hCG Quant 2,979.0 IU/L (mIU/mL)      Comment: Approx. Gestational Age   Approx. hCG Range         (Weeks)                  (IU/L)          0.2-1                    5-50            1-2                               2-3                  100-5000            3-4                   500-10,000            4-5                 1000-50,000            5-6               10,000-100,000            6-8               15,000-200,000            8-12              10,000-100,000       Wet prep, genital Vagina [[id number]]  (Abnormal) Collected: 03/04/22 0314    Specimen: Vaginal Swab Updated: 03/04/22 0407     WBC, Wet Prep 1+     Clue Cells, Wet Prep 3+     Yeast, Wet Prep None Seen     Trich, Wet Prep None Seen     Whiff Test Fishy odor detected when specimen mixed with KOH     Comment: A Wet Prep test should not be used as the sole method for detection of bacterial vaginosis.        Comprehensive metabolic panel [[id number]]  (Abnormal) Collected: 03/04/22 0320    Specimen: Blood, Venous Updated: 03/04/22 0348     Sodium 132 mEQ/L      Potassium 3.7 mEQ/L      Comment: Results obtained on plasma. Plasma Potassium values may be up to 0.4 mEQ/L less than serum values. The differences may be greater for patients with high platelet or white cell counts.        Chloride 105 mEQ/L      CO2 20 mEQ/L      BUN 11 mg/dL      Creatinine 0.5 mg/dL      Glucose 97 mg/dL      Calcium 8.8 mg/dL      AST (SGOT) 14 IU/L      ALT (SGPT) 12 IU/L      Alkaline Phosphatase 36 IU/L      Total Protein 7.4 g/dL      Comment: Test performed on plasma which typically contains approximately 0.4 g/dL more protein than serum.        Albumin 3.9 g/dL      Bilirubin, Total 0.6 mg/dL      eGFR >60.0 mL/min/1.73m*2      Anion Gap 7 mEQ/L     CBC and differential [[id number]] Collected: 03/04/22 0320    Specimen: Blood, Venous Updated: 03/04/22 0329     WBC 6.02 K/uL      RBC 4.13 M/uL      Hemoglobin 11.9 g/dL      Hematocrit 36.2 %      MCV 87.7 fL      MCH 28.8 pg      MCHC 32.9 g/dL      RDW 12.7 %      Platelets 244 K/uL      MPV 10.5 fL      Differential Type Auto     nRBC 0.0 %      Immature Granulocytes 0.0 %      Neutrophils 33.4 %      Lymphocytes 56.6 %      Monocytes 8.0 %      Eosinophils 1.5 %      Basophils 0.5 %      Immature  Granulocytes, Absolute 0.00 K/uL      Neutrophils, Absolute 2.01 K/uL      Lymphocytes, Absolute 3.41 K/uL      Monocytes, Absolute 0.48 K/uL      Eosinophils, Absolute 0.09 K/uL      Basophils, Absolute 0.03 K/uL     Chlamydia/GC RNA:ThinPrep/Urine/Swab Cervical [[id number]] Collected: 03/04/22 0313    Specimen: Swab from Cervical Updated: 03/04/22 0325    UA with reflex culture [[id number]]  (Abnormal) Collected: 03/04/22 0214    Specimen: Urine, Clean Catch Updated: 03/04/22 0240    Narrative:      The following orders were created for panel order UA with reflex culture.  Procedure                               Abnormality         Status                     ---------                               -----------         ------                     UA Reflex to Culture (Ma...[[id number]]  Abnormal            Final result               UA Microscopic[[id number]]               Abnormal            Final result                 Please view results for these tests on the individual orders.    UA Microscopic [[id number]]  (Abnormal) Collected: 03/04/22 0214    Specimen: Urine, Clean Catch Updated: 03/04/22 0240     RBC, Urine 0 TO 4 /HPF      WBC, Urine 0 TO 3 /HPF      Squamous Epithelial Rare /hpf      Hyaline Cast None Seen /lpf      Bacteria, Urine Rare /HPF      MUCUSUA Rare /LPF     UA Reflex to Culture (Macroscopic) [[id number]]  (Abnormal) Collected: 03/04/22 0214    Specimen: Urine, Clean Catch Updated: 03/04/22 0229     Color, Urine Yellow     Clarity, Urine Clear     Specific Gravity, Urine 1.033     pH, Urine 6.5     Leukocyte Esterase Negative     Comment: Results can be falsely negative due to high specific gravity, some antibiotics, glucose >3 g/dl, or WBC other than neutrophils.        Nitrite, Urine Negative     Protein, Urine Trace     Comment: Trace False Positive Protein can be seen with alkaline or highly buffered urines or urine with high specific gravity. Suggest clinical correlation.        Glucose, Urine  Negative mg/dL      Ketones, Urine Negative mg/dL      Urobilinogen, Urine 2.0 EU/dL      Bilirubin, Urine Negative mg/dL      Blood, Urine Negative     Comment: The sensitivity of the occult blood test is equivalent to approximately 4 intact RBC/HPF.             Imaging Results    None         No orders to display       Scoring tools                                 ED Course & MDM   MDM / ED COURSE / CLINICAL IMPRESSIONS / DISPO     MDM  Number of Diagnoses or Management Options      ED Course as of 03/04/22 0751   Fri Mar 04, 2022   0750 Pt with likely BV on wet prep.  Plan to treat with PO Flagyl and dc to home.  Pt encouraged to follow up as an outpatient.   [RP]      ED Course User Index  [RP] Gisell Miner DO         Clinical Impressions as of 03/04/22 0751   Bacterial vaginosis in pregnancy              Gisell Miner DO  03/04/22 0751

## 2022-03-07 ENCOUNTER — APPOINTMENT (RX ONLY)
Dept: URBAN - METROPOLITAN AREA CLINIC 374 | Facility: CLINIC | Age: 27
Setting detail: DERMATOLOGY
End: 2022-03-07

## 2022-03-07 DIAGNOSIS — L65.8 OTHER SPECIFIED NONSCARRING HAIR LOSS: ICD-10-CM | Status: INADEQUATELY CONTROLLED

## 2022-03-07 PROCEDURE — ? PHOTO-DOCUMENTATION

## 2022-03-07 PROCEDURE — ? DEFER

## 2022-03-07 PROCEDURE — ? COUNSELING

## 2022-03-07 PROCEDURE — 99202 OFFICE O/P NEW SF 15 MIN: CPT

## 2022-03-07 PROCEDURE — ? ADDITIONAL NOTES

## 2022-03-07 ASSESSMENT — LOCATION SIMPLE DESCRIPTION DERM
LOCATION SIMPLE: RIGHT SCALP
LOCATION SIMPLE: LEFT SCALP

## 2022-03-07 ASSESSMENT — LOCATION ZONE DERM: LOCATION ZONE: SCALP

## 2022-03-07 ASSESSMENT — LOCATION DETAILED DESCRIPTION DERM
LOCATION DETAILED: LEFT CENTRAL FRONTAL SCALP
LOCATION DETAILED: RIGHT CENTRAL FRONTAL SCALP

## 2022-03-07 NOTE — HPI: HAIR LOSS
Previous Labs: No
How Did The Hair Loss Occur?: gradual in onset
How Severe Is Your Hair Loss?: mild
Additional History: Patient will be having a hair transplant and patient needed a diagnosis prior to surgery.

## 2022-03-07 NOTE — PROCEDURE: ADDITIONAL NOTES
Additional Notes: Patient will be having a hair transplant at [facility] Additional Notes: Patient will be having a hair transplant at [facility]

## 2022-07-21 ENCOUNTER — HOSPITAL ENCOUNTER (EMERGENCY)
Facility: HOSPITAL | Age: 27
Discharge: LEFT BEFORE DISCHARGE | End: 2022-07-22
Payer: COMMERCIAL

## 2022-07-21 ENCOUNTER — HOSPITAL ENCOUNTER (OUTPATIENT)
Facility: HOSPITAL | Age: 27
Discharge: STILL ADMITTED OR ADMINISTRATIVE | End: 2022-07-21
Attending: OBSTETRICS & GYNECOLOGY | Admitting: OBSTETRICS & GYNECOLOGY
Payer: COMMERCIAL

## 2022-07-21 VITALS
RESPIRATION RATE: 19 BRPM | WEIGHT: 180 LBS | OXYGEN SATURATION: 99 % | SYSTOLIC BLOOD PRESSURE: 98 MMHG | TEMPERATURE: 98.5 F | HEART RATE: 127 BPM | BODY MASS INDEX: 28.93 KG/M2 | HEIGHT: 66 IN | DIASTOLIC BLOOD PRESSURE: 63 MMHG
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
## 2023-06-22 ENCOUNTER — APPOINTMENT (EMERGENCY)
Dept: RADIOLOGY | Facility: HOSPITAL | Age: 28
End: 2023-06-22
Payer: COMMERCIAL

## 2023-06-22 ENCOUNTER — HOSPITAL ENCOUNTER (EMERGENCY)
Facility: HOSPITAL | Age: 28
Discharge: HOME | End: 2023-06-22
Attending: EMERGENCY MEDICINE
Payer: COMMERCIAL

## 2023-06-22 VITALS
SYSTOLIC BLOOD PRESSURE: 141 MMHG | BODY MASS INDEX: 28.93 KG/M2 | TEMPERATURE: 98.3 F | HEIGHT: 66 IN | OXYGEN SATURATION: 100 % | RESPIRATION RATE: 18 BRPM | DIASTOLIC BLOOD PRESSURE: 90 MMHG | HEART RATE: 79 BPM | WEIGHT: 180 LBS

## 2023-06-22 DIAGNOSIS — N73.0 PID (ACUTE PELVIC INFLAMMATORY DISEASE): Primary | ICD-10-CM

## 2023-06-22 LAB
ALBUMIN SERPL-MCNC: 3.9 G/DL (ref 3.4–5)
ALP SERPL-CCNC: 55 IU/L (ref 35–126)
ALT SERPL-CCNC: 12 IU/L (ref 11–54)
ANION GAP SERPL CALC-SCNC: 7 MEQ/L (ref 3–15)
AST SERPL-CCNC: 15 IU/L (ref 15–41)
B-HCG UR QL: NEGATIVE
BACTERIA URNS QL MICRO: ABNORMAL /HPF
BASOPHILS # BLD: 0.03 K/UL (ref 0.01–0.1)
BASOPHILS NFR BLD: 0.4 %
BILIRUB SERPL-MCNC: 0.3 MG/DL (ref 0.3–1.2)
BILIRUB UR QL STRIP.AUTO: NEGATIVE MG/DL
BUN SERPL-MCNC: 14 MG/DL (ref 8–20)
BV WHIFF TEST VAG QL: ABNORMAL
CALCIUM SERPL-MCNC: 9 MG/DL (ref 8.9–10.3)
CHLORIDE SERPL-SCNC: 104 MEQ/L (ref 98–109)
CLARITY UR REFRACT.AUTO: CLEAR
CLUE CELLS SPEC QL WET PREP: ABNORMAL
CO2 SERPL-SCNC: 26 MEQ/L (ref 22–32)
COLOR UR AUTO: YELLOW
CREAT SERPL-MCNC: 0.6 MG/DL (ref 0.6–1.1)
DIFFERENTIAL METHOD BLD: ABNORMAL
EOSINOPHIL # BLD: 0.06 K/UL (ref 0.04–0.36)
EOSINOPHIL NFR BLD: 0.9 %
ERYTHROCYTE [DISTWIDTH] IN BLOOD BY AUTOMATED COUNT: 13.2 % (ref 11.7–14.4)
GFR SERPL CREATININE-BSD FRML MDRD: >60 ML/MIN/1.73M*2
GLUCOSE SERPL-MCNC: 95 MG/DL (ref 70–99)
GLUCOSE UR STRIP.AUTO-MCNC: NEGATIVE MG/DL
HCT VFR BLDCO AUTO: 38.6 % (ref 35–45)
HGB BLD-MCNC: 12.6 G/DL (ref 11.8–15.7)
HGB UR QL STRIP.AUTO: NEGATIVE
HYALINE CASTS #/AREA URNS LPF: ABNORMAL /LPF
IMM GRANULOCYTES # BLD AUTO: 0.01 K/UL (ref 0–0.08)
IMM GRANULOCYTES NFR BLD AUTO: 0.1 %
KETONES UR STRIP.AUTO-MCNC: NEGATIVE MG/DL
LEUKOCYTE ESTERASE UR QL STRIP.AUTO: 2
LYMPHOCYTES # BLD: 2.77 K/UL (ref 1.2–3.5)
LYMPHOCYTES NFR BLD: 40.2 %
MCH RBC QN AUTO: 27.6 PG (ref 28–33.2)
MCHC RBC AUTO-ENTMCNC: 32.6 G/DL (ref 32.2–35.5)
MCV RBC AUTO: 84.6 FL (ref 83–98)
MONOCYTES # BLD: 0.59 K/UL (ref 0.28–0.8)
MONOCYTES NFR BLD: 8.6 %
MUCOUS THREADS URNS QL MICRO: ABNORMAL /LPF
NEUTROPHILS # BLD: 3.43 K/UL (ref 1.7–7)
NEUTS SEG NFR BLD: 49.8 %
NITRITE UR QL STRIP.AUTO: NEGATIVE
NRBC BLD-RTO: 0 %
PDW BLD AUTO: 11.1 FL (ref 9.4–12.3)
PH UR STRIP.AUTO: 6 [PH]
PLATELET # BLD AUTO: 253 K/UL (ref 150–369)
POCT TEST: NORMAL
POTASSIUM SERPL-SCNC: 3.7 MEQ/L (ref 3.6–5.1)
PROT SERPL-MCNC: 7.7 G/DL (ref 6–8.2)
PROT UR QL STRIP.AUTO: NEGATIVE
RBC # BLD AUTO: 4.56 M/UL (ref 3.93–5.22)
RBC #/AREA URNS HPF: ABNORMAL /HPF
SODIUM SERPL-SCNC: 137 MEQ/L (ref 136–144)
SP GR UR REFRACT.AUTO: 1.02
SQUAMOUS URNS QL MICRO: 2 /HPF
T VAGINALIS GENITAL QL WET PREP: ABNORMAL
UROBILINOGEN UR STRIP-ACNC: 0.2 EU/DL
WBC # BLD AUTO: 6.89 K/UL (ref 3.8–10.5)
WBC #/AREA URNS HPF: ABNORMAL /HPF
WBC VAG QL WET PREP: ABNORMAL
YEAST VAG QL WET PREP: ABNORMAL

## 2023-06-22 PROCEDURE — 80053 COMPREHEN METABOLIC PANEL: CPT | Performed by: EMERGENCY MEDICINE

## 2023-06-22 PROCEDURE — 93975 VASCULAR STUDY: CPT

## 2023-06-22 PROCEDURE — 96374 THER/PROPH/DIAG INJ IV PUSH: CPT

## 2023-06-22 PROCEDURE — 3E02329 INTRODUCTION OF OTHER ANTI-INFECTIVE INTO MUSCLE, PERCUTANEOUS APPROACH: ICD-10-PCS | Performed by: EMERGENCY MEDICINE

## 2023-06-22 PROCEDURE — 87210 SMEAR WET MOUNT SALINE/INK: CPT | Performed by: PHYSICIAN ASSISTANT

## 2023-06-22 PROCEDURE — 87491 CHLMYD TRACH DNA AMP PROBE: CPT | Performed by: PHYSICIAN ASSISTANT

## 2023-06-22 PROCEDURE — 87591 N.GONORRHOEAE DNA AMP PROB: CPT | Performed by: PHYSICIAN ASSISTANT

## 2023-06-22 PROCEDURE — 81001 URINALYSIS AUTO W/SCOPE: CPT | Performed by: EMERGENCY MEDICINE

## 2023-06-22 PROCEDURE — 85025 COMPLETE CBC W/AUTO DIFF WBC: CPT

## 2023-06-22 PROCEDURE — 36415 COLL VENOUS BLD VENIPUNCTURE: CPT

## 2023-06-22 PROCEDURE — 81003 URINALYSIS AUTO W/O SCOPE: CPT | Performed by: EMERGENCY MEDICINE

## 2023-06-22 PROCEDURE — 99284 EMERGENCY DEPT VISIT MOD MDM: CPT | Mod: 25

## 2023-06-22 PROCEDURE — 25800000 HC PHARMACY IV SOLUTIONS: Performed by: PHYSICIAN ASSISTANT

## 2023-06-22 PROCEDURE — 85025 COMPLETE CBC W/AUTO DIFF WBC: CPT | Performed by: EMERGENCY MEDICINE

## 2023-06-22 PROCEDURE — 25000000 HC PHARMACY GENERAL: Performed by: PHYSICIAN ASSISTANT

## 2023-06-22 PROCEDURE — 3E0337Z INTRODUCTION OF ELECTROLYTIC AND WATER BALANCE SUBSTANCE INTO PERIPHERAL VEIN, PERCUTANEOUS APPROACH: ICD-10-PCS | Performed by: EMERGENCY MEDICINE

## 2023-06-22 PROCEDURE — 96372 THER/PROPH/DIAG INJ SC/IM: CPT | Mod: 25

## 2023-06-22 PROCEDURE — 76830 TRANSVAGINAL US NON-OB: CPT

## 2023-06-22 PROCEDURE — 76857 US EXAM PELVIC LIMITED: CPT

## 2023-06-22 PROCEDURE — 63600000 HC DRUGS/DETAIL CODE: Mod: JZ | Performed by: PHYSICIAN ASSISTANT

## 2023-06-22 PROCEDURE — 3E0333Z INTRODUCTION OF ANTI-INFLAMMATORY INTO PERIPHERAL VEIN, PERCUTANEOUS APPROACH: ICD-10-PCS | Performed by: EMERGENCY MEDICINE

## 2023-06-22 RX ORDER — METRONIDAZOLE 500 MG/1
500 TABLET ORAL 2 TIMES DAILY
Qty: 28 TABLET | Refills: 0 | Status: SHIPPED | OUTPATIENT
Start: 2023-06-22 | End: 2023-07-06

## 2023-06-22 RX ORDER — KETOROLAC TROMETHAMINE 30 MG/ML
30 INJECTION, SOLUTION INTRAMUSCULAR; INTRAVENOUS ONCE
Status: COMPLETED | OUTPATIENT
Start: 2023-06-22 | End: 2023-06-22

## 2023-06-22 RX ORDER — DOXYCYCLINE 100 MG/1
100 CAPSULE ORAL 2 TIMES DAILY
Qty: 28 CAPSULE | Refills: 0 | Status: SHIPPED | OUTPATIENT
Start: 2023-06-22 | End: 2023-07-06

## 2023-06-22 RX ADMIN — KETOROLAC TROMETHAMINE 30 MG: 30 INJECTION, SOLUTION INTRAMUSCULAR at 10:47

## 2023-06-22 RX ADMIN — CEFTRIAXONE SODIUM 500 MG: 500 INJECTION, POWDER, FOR SOLUTION INTRAMUSCULAR; INTRAVENOUS at 13:04

## 2023-06-22 RX ADMIN — SODIUM CHLORIDE 1000 ML: 9 INJECTION, SOLUTION INTRAVENOUS at 10:46

## 2023-06-22 ASSESSMENT — ENCOUNTER SYMPTOMS
CHILLS: 0
HEMATURIA: 0
COUGH: 0
VOMITING: 0
NECK PAIN: 0
SHORTNESS OF BREATH: 0
NAUSEA: 1
DIARRHEA: 0
FEVER: 0
BACK PAIN: 0
ABDOMINAL PAIN: 1
HEADACHES: 0
DYSURIA: 0
WEAKNESS: 0
DIFFICULTY URINATING: 0

## 2023-06-22 NOTE — ED PROVIDER NOTES
"Emergency Medicine Note  HPI   HISTORY OF PRESENT ILLNESS     29 y/o female with PMHx of ovarian cyst presents c/o L>R lower abdominal/pelvic pain x 2 days. States it is a cramping with periods of more \"sharp\" pain. States it was mild when it began but has become worse. Endorses nausea. Denies vomiting, diarrhea, dysuria, hematuria or fevers. Notes minimal vaginal discharge. States when wiping x 1 did notice some blood but not enough for a pad or tampon.            Patient History   PAST HISTORY     Reviewed from Nursing Triage:       History reviewed. No pertinent past medical history.    Past Surgical History:   Procedure Laterality Date   • COMBINED AUGMENTATION MAMMAPLASTY AND ABDOMINOPLASTY         History reviewed. No pertinent family history.    Social History     Tobacco Use   • Smoking status: Never   • Smokeless tobacco: Never   Substance Use Topics   • Alcohol use: Never   • Drug use: Never         Review of Systems   REVIEW OF SYSTEMS     Review of Systems   Constitutional: Negative for chills and fever.   Respiratory: Negative for cough and shortness of breath.    Cardiovascular: Negative for chest pain and leg swelling.   Gastrointestinal: Positive for abdominal pain and nausea. Negative for diarrhea and vomiting.   Genitourinary: Positive for pelvic pain. Negative for difficulty urinating, dysuria and hematuria.   Musculoskeletal: Negative for back pain and neck pain.   Neurological: Negative for weakness and headaches.         VITALS     ED Vitals    Date/Time Temp Pulse Resp BP SpO2 [facility]   06/22/23 0850 36.8 °C (98.3 °F) 79 18 141/90 100 % MET        Pulse Ox %: 100 % (06/22/23 0943)  Pulse Ox Interpretation: Normal (06/22/23 0943)           Physical Exam   PHYSICAL EXAM     Physical Exam  Vitals and nursing note reviewed.   Constitutional:       General: She is not in acute distress.     Appearance: She is well-developed. She is not ill-appearing or toxic-appearing.   HENT:      Head: Normocephalic " and atraumatic.   Eyes:      Conjunctiva/sclera: Conjunctivae normal.   Cardiovascular:      Rate and Rhythm: Normal rate and regular rhythm.   Pulmonary:      Effort: Pulmonary effort is normal.      Breath sounds: Normal breath sounds.   Abdominal:      General: There is no distension.      Palpations: Abdomen is soft. There is no mass.      Tenderness: There is no abdominal tenderness. There is no guarding. Negative signs include Johnson's sign and McBurney's sign.   Genitourinary:     Comments: Pelvic exam performed with ANTONIETA Wing. + yellow vaginal discharge. No CMT. Some R adnexal ttp.  Musculoskeletal:         General: No tenderness or deformity. Normal range of motion.      Cervical back: Normal range of motion.   Skin:     General: Skin is warm and dry.   Neurological:      Mental Status: She is alert. Mental status is at baseline.   Psychiatric:         Behavior: Behavior normal.           PROCEDURES     Procedures     DATA     Results     Procedure Component Value Units Date/Time    Urinalysis with Reflex Culture [[id number]]  (Abnormal) Collected: 06/22/23 0856    Specimen: Urine, Clean Catch Updated: 06/22/23 1119    Narrative:      The following orders were created for panel order Urinalysis with Reflex Culture.  Procedure                               Abnormality         Status                     ---------                               -----------         ------                     UA Reflex to Culture (Ma...[[id number]]  Abnormal            Final result               UA Microscopic[[id number]]               Abnormal            Final result                 Please view results for these tests on the individual orders.    UA Microscopic [[id number]]  (Abnormal) Collected: 06/22/23 0856    Specimen: Urine, Clean Catch Updated: 06/22/23 1119     RBC, Urine 0 TO 4 /HPF      WBC, Urine 0 TO 3 /HPF      Squamous Epithelial +2 /hpf      Hyaline Cast None Seen /lpf      Bacteria, Urine Rare /HPF      Mucus  Rare /LPF     Wet prep, genital Vagina [[id number]]  (Abnormal) Collected: 06/22/23 1052    Specimen: Vaginal Swab Updated: 06/22/23 1117     WBC, Wet Prep 2+     Clue Cells, Wet Prep None Seen     Yeast, Wet Prep None Seen     Trich, Wet Prep None Seen     Whiff Test No fishy odor detected when specimen mixed with KOH     Comment: A Wet Prep test should not be used as the sole method for detection of bacterial vaginosis.        Chlamydia/GC RNA:ThinPrep/Urine/Swab Cervical [[id number]] Collected: 06/22/23 1052    Specimen: Swab from Cervical Updated: 06/22/23 1056    UA Reflex to Culture (Macroscopic) [[id number]]  (Abnormal) Collected: 06/22/23 0856    Specimen: Urine, Clean Catch Updated: 06/22/23 1011     Color, Urine Yellow     Clarity, Urine Clear     Specific Gravity, Urine 1.025     pH, Urine 6.0     Leukocyte Esterase +2     Nitrite, Urine Negative     Protein, Urine Negative     Glucose, Urine Negative mg/dL      Ketones, Urine Negative mg/dL      Urobilinogen, Urine 0.2 EU/dL      Bilirubin, Urine Negative mg/dL      Blood, Urine Negative     Comment: The sensitivity of the occult blood test is equivalent to approximately 4 intact RBC/HPF.       Comprehensive metabolic panel [[id number]]  (Normal) Collected: 06/22/23 0905    Specimen: Blood, Venous Updated: 06/22/23 0937     Sodium 137 mEQ/L      Potassium 3.7 mEQ/L      Comment: Results obtained on plasma. Plasma Potassium values may be up to 0.4 mEQ/L less than serum values. The differences may be greater for patients with high platelet or white cell counts.        Chloride 104 mEQ/L      CO2 26 mEQ/L      BUN 14 mg/dL      Creatinine 0.6 mg/dL      Glucose 95 mg/dL      Calcium 9.0 mg/dL      AST (SGOT) 15 IU/L      ALT (SGPT) 12 IU/L      Alkaline Phosphatase 55 IU/L      Total Protein 7.7 g/dL      Comment: Test performed on plasma which typically contains approximately 0.4 g/dL more protein than serum.        Albumin 3.9 g/dL      Bilirubin, Total 0.3  mg/dL      eGFR >60.0 mL/min/1.73m*2      Anion Gap 7 mEQ/L     CBC and differential [[id number]]  (Abnormal) Collected: 06/22/23 0905    Specimen: Blood, Venous Updated: 06/22/23 0917     WBC 6.89 K/uL      RBC 4.56 M/uL      Hemoglobin 12.6 g/dL      Hematocrit 38.6 %      MCV 84.6 fL      MCH 27.6 pg      MCHC 32.6 g/dL      RDW 13.2 %      Platelets 253 K/uL      MPV 11.1 fL      Differential Type Auto     nRBC 0.0 %      Immature Granulocytes 0.1 %      Neutrophils 49.8 %      Lymphocytes 40.2 %      Monocytes 8.6 %      Eosinophils 0.9 %      Basophils 0.4 %      Immature Granulocytes, Absolute 0.01 K/uL      Neutrophils, Absolute 3.43 K/uL      Lymphocytes, Absolute 2.77 K/uL      Monocytes, Absolute 0.59 K/uL      Eosinophils, Absolute 0.06 K/uL      Basophils, Absolute 0.03 K/uL           Imaging Results          ULTRASOUND PELVIS LIMITED TRANSABDOMINAL ONLY (Final result)  Result time 06/22/23 12:45:23    Final result                 Impression:    IMPRESSION:    1. No evidence for ovarian torsion.  Normal arterial and venous waveforms are  documented in both ovaries.    2. Findings of right tubal/adnexal inflammation, raising possibility of pelvic  inflammatory disease.    3. Polycystic bilateral ovaries.    4. Unremarkable appearance of the uterus.               Narrative:    CLINICAL HISTORY: Left greater than right lower abdominal/pelvic pain.  Clinical  concern for ovarian torsion. History of ovarian cysts.    Comparison: None.    COMMENT: A limited transabdominal pelvis and transvaginal pelvic ultrasound  examination was performed.  Transabdominal ultrasound was performed to allow a  broad examination of the pelvis to visualize structures that cannot be seen with  transvaginal imaging alone.  Endovaginal scans were performed to better evaluate  the endometrium and adnexa.    Dedicated spectral Doppler imaging of both  ovaries is performed assess for torsion.    The uterus measures 3.9 x  5.3 x 9.3 cm.  The parenchyma is homogeneous without  focal mass.  The endometrial stripe measures 0.4 mm in maximum thickness.  There  is no endometrial mass or abnormal vascularity of the endometrium.    The right ovary measures 2.6  x 2.8 x 3.5 cm.  There is a predominantly solid  and intermediately echogenic focus adjacent to the right ovary with internal  vascularity and extending towards the uterus, overall measuring 3.4 x 2.4 x 2.5  cm. This may represent thickened/inflamed fallopian tubes/surrounding fat,  raising possibility of pelvic inflammatory disease. No definite  hydrosalpinx/pyosalpinx identified.    The left ovary measures 2.2 x 3.3 x 2.3  cm.  There is a 1.7 cm paraovarian  cyst.    Both ovaries demonstrate numerous (greater than 20 each) small follicles in the  bilateral cysts in a peripheral location with central stromal hyperplasia,  suggesting polycystic polycystic ovaries.    Normal arterial and venous Doppler waveforms are documented in both ovaries.  No  evidence for ovarian torsion.    There is  small free fluid in the pelvis.                                 ULTRASOUND DOPPLER (Final result)  Result time 06/22/23 12:45:23    Final result                 Impression:    IMPRESSION:    1. No evidence for ovarian torsion.  Normal arterial and venous waveforms are  documented in both ovaries.    2. Findings of right tubal/adnexal inflammation, raising possibility of pelvic  inflammatory disease.    3. Polycystic bilateral ovaries.    4. Unremarkable appearance of the uterus.               Narrative:    CLINICAL HISTORY: Left greater than right lower abdominal/pelvic pain.  Clinical  concern for ovarian torsion. History of ovarian cysts.    Comparison: None.    COMMENT: A limited transabdominal pelvis and transvaginal pelvic ultrasound  examination was performed.  Transabdominal ultrasound was performed to allow a  broad examination of the pelvis to visualize structures that cannot be seen with  transvaginal  imaging alone.  Endovaginal scans were performed to better evaluate  the endometrium and adnexa.    Dedicated spectral Doppler imaging of both  ovaries is performed assess for torsion.    The uterus measures 3.9 x  5.3 x 9.3 cm. The parenchyma is homogeneous without  focal mass.  The endometrial stripe measures 0.4 mm in maximum thickness.  There  is no endometrial mass or abnormal vascularity of the endometrium.    The right ovary measures 2.6  x 2.8 x 3.5 cm.  There is a predominantly solid  and intermediately echogenic focus adjacent to the right ovary with internal  vascularity and extending towards the uterus, overall measuring 3.4 x 2.4 x 2.5  cm. This may represent thickened/inflamed fallopian tubes/surrounding fat,  raising possibility of pelvic inflammatory disease. No definite  hydrosalpinx/pyosalpinx identified.    The left ovary measures 2.2 x 3.3 x 2.3  cm.  There is a 1.7 cm paraovarian  cyst.    Both ovaries demonstrate numerous (greater than 20 each) small follicles in the  bilateral cysts in a peripheral location with central stromal hyperplasia,  suggesting polycystic polycystic ovaries.    Normal arterial and venous Doppler waveforms are documented in both ovaries.  No  evidence for ovarian torsion.    There is  small free fluid in the pelvis.                                 ULTRASOUND PELVIS TRANSVAGINAL ONLY (Final result)  Result time 06/22/23 12:45:23    Final result                 Impression:    IMPRESSION:    1. No evidence for ovarian torsion.  Normal arterial and venous waveforms are  documented in both ovaries.    2. Findings of right tubal/adnexal inflammation, raising possibility of pelvic  inflammatory disease.    3. Polycystic bilateral ovaries.    4. Unremarkable appearance of the uterus.               Narrative:    CLINICAL HISTORY: Left greater than right lower abdominal/pelvic pain.  Clinical  concern for ovarian torsion. History of ovarian cysts.    Comparison: None.    COMMENT:  A limited transabdominal pelvis and transvaginal pelvic ultrasound  examination was performed.  Transabdominal ultrasound was performed to allow a  broad examination of the pelvis to visualize structures that cannot be seen with  transvaginal imaging alone.  Endovaginal scans were performed to better evaluate  the endometrium and adnexa.    Dedicated spectral Doppler imaging of both  ovaries is performed assess for torsion.    The uterus measures 3.9 x  5.3 x 9.3 cm. The parenchyma is homogeneous without  focal mass.  The endometrial stripe measures 0.4 mm in maximum thickness.  There  is no endometrial mass or abnormal vascularity of the endometrium.    The right ovary measures 2.6  x 2.8 x 3.5 cm.  There is a predominantly solid  and intermediately echogenic focus adjacent to the right ovary with internal  vascularity and extending towards the uterus, overall measuring 3.4 x 2.4 x 2.5  cm. This may represent thickened/inflamed fallopian tubes/surrounding fat,  raising possibility of pelvic inflammatory disease. No definite  hydrosalpinx/pyosalpinx identified.    The left ovary measures 2.2 x 3.3 x 2.3  cm.  There is a 1.7 cm paraovarian  cyst.    Both ovaries demonstrate numerous (greater than 20 each) small follicles in the  bilateral cysts in a peripheral location with central stromal hyperplasia,  suggesting polycystic polycystic ovaries.    Normal arterial and venous Doppler waveforms are documented in both ovaries.  No  evidence for ovarian torsion.    There is  small free fluid in the pelvis.                                  No orders to display       Scoring tools                                  ED Course & MDM   MDM / ED COURSE / CLINICAL IMPRESSION / DISPO     Medical Decision Making  PID. Exam and work-up c/w PID. Ceftriaxone IM given in ED. 2 weeks of doxy and flagyl sent to pharmacy. Discussed importance of f/u with GYN. Patient well/non-toxic appearing.    Amount and/or Complexity of Data  "Reviewed  Labs: ordered. Decision-making details documented in ED Course.  Radiology: ordered. Decision-making details documented in ED Course.      Risk  Prescription drug management.          ED Course as of 06/22/23 1301   Thu Jun 22, 2023   0943 POCT BhCG, Urine Qual: Negative [CP]   0943 WBC: 6.89 [CP]   1102 Patient given CT results and plan for admission. She is resting comfortably, no respiratory distress or difficulty tolerating secretions. [CP]   1110 Patient notes she is sexually active with 1 partner and recently has not used protection. Patient overhead he may \"have something.\" [CP]   1249 ULTRASOUND PELVIS LIMITED TRANSABDOMINAL ONLY  Findings of right tubal/adnexal inflammation, raising possibility of pelvic  inflammatory disease. [CP]      ED Course User Index  [CP] Judy Cristobal PA C     Clinical Impression      PID (acute pelvic inflammatory disease)     _________________     ED Disposition   Discharge                   Judy Cristobal PA C  06/22/23 1301    "

## 2023-06-22 NOTE — ED ATTESTATION NOTE
The patient was evaluated and managed by the physician assistant / nurse practitioner.       Kamaljit Hooks MD  06/22/23 4025

## 2023-06-26 LAB
C TRACH RRNA SPEC QL NAA+PROBE: POSITIVE
N GONORRHOEA RRNA SPEC QL NAA+PROBE: NEGATIVE

## 2024-01-23 LAB
ALBUMIN SERPL-MCNC: 4.2 G/DL (ref 3.5–5.7)
ALP SERPL-CCNC: 34 IU/L (ref 34–125)
ALT SERPL-CCNC: 9 IU/L (ref 7–52)
ANION GAP SERPL CALC-SCNC: 6 MEQ/L (ref 3–15)
AST SERPL-CCNC: 15 IU/L (ref 13–39)
BASOPHILS # BLD: 0.01 K/UL (ref 0.01–0.1)
BASOPHILS NFR BLD: 0.3 %
BILIRUB SERPL-MCNC: 0.3 MG/DL (ref 0.3–1.2)
BUN SERPL-MCNC: 8 MG/DL (ref 7–25)
CALCIUM SERPL-MCNC: 9.1 MG/DL (ref 8.6–10.3)
CHLORIDE SERPL-SCNC: 101 MEQ/L (ref 98–107)
CO2 SERPL-SCNC: 26 MEQ/L (ref 21–31)
CREAT SERPL-MCNC: 0.7 MG/DL (ref 0.6–1.2)
DIFFERENTIAL METHOD BLD: ABNORMAL
EGFRCR SERPLBLD CKD-EPI 2021: >60 ML/MIN/1.73M*2
EOSINOPHIL # BLD: 0 K/UL (ref 0.04–0.36)
EOSINOPHIL NFR BLD: 0 %
ERYTHROCYTE [DISTWIDTH] IN BLOOD BY AUTOMATED COUNT: 13.8 % (ref 11.7–14.4)
FLUAV RNA SPEC QL NAA+PROBE: NEGATIVE
FLUBV RNA SPEC QL NAA+PROBE: POSITIVE
GLUCOSE SERPL-MCNC: 105 MG/DL (ref 70–99)
HCG SERPL-ACNC: ABNORMAL IU/L (MIU/ML)
HCT VFR BLD AUTO: 37.9 % (ref 35–45)
HGB BLD-MCNC: 12.2 G/DL (ref 11.8–15.7)
IMM GRANULOCYTES # BLD AUTO: 0.01 K/UL (ref 0–0.08)
IMM GRANULOCYTES NFR BLD AUTO: 0.3 %
LYMPHOCYTES # BLD: 1.79 K/UL (ref 1.2–3.5)
LYMPHOCYTES NFR BLD: 55.4 %
MCH RBC QN AUTO: 27.6 PG (ref 28–33.2)
MCHC RBC AUTO-ENTMCNC: 32.2 G/DL (ref 32.2–35.5)
MCV RBC AUTO: 85.7 FL (ref 83–98)
MONOCYTES # BLD: 0.37 K/UL (ref 0.28–0.8)
MONOCYTES NFR BLD: 11.5 %
NEUTROPHILS # BLD: 1.05 K/UL (ref 1.7–7)
NEUTS SEG NFR BLD: 32.5 %
NRBC BLD-RTO: 0 %
PDW BLD AUTO: 11.3 FL (ref 9.4–12.3)
PLAT MORPH BLD: NORMAL
PLATELET # BLD AUTO: 214 K/UL (ref 150–369)
PLATELET # BLD EST: ABNORMAL 10*3/UL
POTASSIUM SERPL-SCNC: 3.7 MEQ/L (ref 3.5–5.1)
PROT SERPL-MCNC: 7.9 G/DL (ref 6–8.2)
RBC # BLD AUTO: 4.42 M/UL (ref 3.93–5.22)
RSV RNA SPEC QL NAA+PROBE: NEGATIVE
SARS-COV-2 RNA RESP QL NAA+PROBE: NEGATIVE
SODIUM SERPL-SCNC: 133 MEQ/L (ref 136–145)
WBC # BLD AUTO: 3.23 K/UL (ref 3.8–10.5)

## 2024-01-23 PROCEDURE — 96360 HYDRATION IV INFUSION INIT: CPT

## 2024-01-23 PROCEDURE — 85025 COMPLETE CBC W/AUTO DIFF WBC: CPT | Performed by: EMERGENCY MEDICINE

## 2024-01-23 PROCEDURE — 36415 COLL VENOUS BLD VENIPUNCTURE: CPT

## 2024-01-23 PROCEDURE — 87637 SARSCOV2&INF A&B&RSV AMP PRB: CPT | Performed by: EMERGENCY MEDICINE

## 2024-01-23 PROCEDURE — 87637 SARSCOV2&INF A&B&RSV AMP PRB: CPT

## 2024-01-23 PROCEDURE — 84702 CHORIONIC GONADOTROPIN TEST: CPT | Performed by: EMERGENCY MEDICINE

## 2024-01-23 PROCEDURE — 93005 ELECTROCARDIOGRAM TRACING: CPT | Performed by: EMERGENCY MEDICINE

## 2024-01-23 PROCEDURE — 3E0337Z INTRODUCTION OF ELECTROLYTIC AND WATER BALANCE SUBSTANCE INTO PERIPHERAL VEIN, PERCUTANEOUS APPROACH: ICD-10-PCS | Performed by: EMERGENCY MEDICINE

## 2024-01-23 PROCEDURE — 93005 ELECTROCARDIOGRAM TRACING: CPT

## 2024-01-23 PROCEDURE — 85025 COMPLETE CBC W/AUTO DIFF WBC: CPT

## 2024-01-23 PROCEDURE — 99284 EMERGENCY DEPT VISIT MOD MDM: CPT | Mod: 25

## 2024-01-23 PROCEDURE — 80053 COMPREHEN METABOLIC PANEL: CPT

## 2024-01-23 PROCEDURE — 80053 COMPREHEN METABOLIC PANEL: CPT | Performed by: EMERGENCY MEDICINE

## 2024-01-23 PROCEDURE — 84702 CHORIONIC GONADOTROPIN TEST: CPT

## 2024-01-24 ENCOUNTER — HOSPITAL ENCOUNTER (EMERGENCY)
Facility: HOSPITAL | Age: 29
Discharge: HOME | End: 2024-01-24
Attending: EMERGENCY MEDICINE

## 2024-01-24 VITALS
DIASTOLIC BLOOD PRESSURE: 80 MMHG | BODY MASS INDEX: 29.32 KG/M2 | WEIGHT: 182.4 LBS | OXYGEN SATURATION: 97 % | RESPIRATION RATE: 18 BRPM | HEIGHT: 66 IN | HEART RATE: 92 BPM | TEMPERATURE: 98.2 F | SYSTOLIC BLOOD PRESSURE: 119 MMHG

## 2024-01-24 DIAGNOSIS — J10.1 INFLUENZA B: Primary | ICD-10-CM

## 2024-01-24 LAB
ATRIAL RATE: 86
P AXIS: 64
PR INTERVAL: 148
QRS DURATION: 62
QT INTERVAL: 352
QTC CALCULATION(BAZETT): 421
R AXIS: 28
T WAVE AXIS: 36
VENTRICULAR RATE: 86

## 2024-01-24 PROCEDURE — 25800000 HC PHARMACY IV SOLUTIONS: Performed by: PHYSICIAN ASSISTANT

## 2024-01-24 RX ORDER — ACETAMINOPHEN 325 MG/1
650 TABLET ORAL ONCE
Status: DISCONTINUED | OUTPATIENT
Start: 2024-01-24 | End: 2024-01-24 | Stop reason: HOSPADM

## 2024-01-24 RX ADMIN — SODIUM CHLORIDE 1000 ML: 9 INJECTION, SOLUTION INTRAVENOUS at 03:39

## 2024-01-24 ASSESSMENT — ENCOUNTER SYMPTOMS
WEAKNESS: 0
MENTAL STATUS CHANGE: 0
FATIGUE: 1
SHORTNESS OF BREATH: 1
RHINORRHEA: 0
DIARRHEA: 0
FLU SYMPTOMS: 1
FEVER: 1
HEADACHES: 0
VOMITING: 0
ABDOMINAL PAIN: 0
SORE THROAT: 0
PALPITATIONS: 0
NECK STIFFNESS: 0
CHEST TIGHTNESS: 1
MYALGIAS: 1
COUGH: 1
NAUSEA: 1
CHILLS: 1
DIZZINESS: 0

## 2024-01-24 NOTE — ED PROVIDER NOTES
Emergency Medicine Note  HPI   HISTORY OF PRESENT ILLNESS       History provided by:  Patient   used: No    Flu Symptoms  Presenting symptoms: cough, fatigue, fever, myalgias, nausea and shortness of breath    Presenting symptoms: no diarrhea, no headaches, no rhinorrhea, no sore throat and no vomiting    Severity:  Moderate  Onset quality:  Gradual  Duration:  4 days  Progression:  Unchanged  Chronicity:  New  Relieved by:  Nothing  Worsened by:  Nothing  Ineffective treatments:  None tried  Associated symptoms: chills    Associated symptoms: no mental status change, no congestion and no neck stiffness          Patient History   PAST HISTORY     Reviewed from Nursing Triage:       History reviewed. No pertinent past medical history.    Past Surgical History:   Procedure Laterality Date   • COMBINED AUGMENTATION MAMMAPLASTY AND ABDOMINOPLASTY         History reviewed. No pertinent family history.    Social History     Tobacco Use   • Smoking status: Never   • Smokeless tobacco: Never   Substance Use Topics   • Alcohol use: Never   • Drug use: Never         Review of Systems   REVIEW OF SYSTEMS     Review of Systems   Constitutional: Positive for chills, fatigue and fever.   HENT: Negative for congestion, rhinorrhea and sore throat.    Respiratory: Positive for cough, chest tightness and shortness of breath.    Cardiovascular: Negative for palpitations and leg swelling.   Gastrointestinal: Positive for nausea. Negative for abdominal pain, diarrhea and vomiting.   Genitourinary: Negative for pelvic pain, vaginal bleeding and vaginal discharge.   Musculoskeletal: Positive for myalgias. Negative for neck stiffness.   Neurological: Negative for dizziness, weakness and headaches.         VITALS     ED Vitals    Date/Time Temp Pulse Resp BP SpO2 [facility]   01/23/24 2136 37.6 °C (99.7 °F) 93 16 117/79 100 % CLK        Pulse Ox %: 100 % (01/24/24 0311)  Pulse Ox Interpretation: Normal (01/24/24 0311)  Heart  Rate: 93 (01/24/24 0312)  Rhythm Strip Interpretation: Normal Sinus Rhythm (01/24/24 0312)     Physical Exam   PHYSICAL EXAM     Physical Exam  Constitutional:       Appearance: She is not ill-appearing or toxic-appearing.   HENT:      Head: Atraumatic.      Mouth/Throat:      Mouth: Mucous membranes are moist.   Eyes:      Conjunctiva/sclera: Conjunctivae normal.   Cardiovascular:      Rate and Rhythm: Normal rate and regular rhythm.      Pulses: Normal pulses.   Pulmonary:      Effort: Pulmonary effort is normal. No respiratory distress.   Abdominal:      Tenderness: There is no abdominal tenderness. There is no guarding or rebound.   Skin:     General: Skin is warm and dry.   Neurological:      General: No focal deficit present.      Mental Status: She is alert and oriented to person, place, and time.           PROCEDURES     Procedures     DATA     Results     Procedure Component Value Units Date/Time    BhCG, Serum, Quant [[id number]]  (Abnormal) Collected: 01/23/24 2156    Specimen: Blood, Venous Updated: 01/23/24 2304     hCG Quant 14,493.0 IU/L (mIU/mL)      Comment: Approx. Gestational Age   Approx. hCG Range         (Weeks)                  (IU/L)          0.2-1                    5-50            1-2                               2-3                  100-5000            3-4                  500-10,000            4-5                 1000-50,000            5-6               10,000-100,000            6-8               15,000-200,000            8-12              10,000-100,000       SARS-CoV-2 (COVID-19) Nasopharynx [[id number]]  (Abnormal) Collected: 01/23/24 2159    Specimen: Nasopharyngeal Swab from Nasopharynx Updated: 01/23/24 2249    Narrative:      The following orders were created for panel order SARS-CoV-2 (COVID-19) Nasopharynx.  Procedure                               Abnormality         Status                     ---------                               -----------         ------                      SARS-COV-2 (COVID-19)/ F...[[id number]]  Abnormal            Final result                 Please view results for these tests on the individual orders.    SARS-COV-2 (COVID-19)/ FLU A/B, AND RSV, PCR Nasopharynx [[id number]]  (Abnormal) Collected: 01/23/24 2159    Specimen: Nasopharyngeal Swab from Nasopharynx Updated: 01/23/24 2249     SARS-CoV-2 (COVID-19) Negative     Influenza A Negative     Influenza B Positive     Respiratory Syncytial Virus Negative    Narrative:      Testing performed using real-time PCR for detection of COVID-19. EUA approved validation studies performed on site.     CBC and differential [[id number]]  (Abnormal) Collected: 01/23/24 2156    Specimen: Blood, Venous Updated: 01/23/24 2236     WBC 3.23 K/uL      RBC 4.42 M/uL      Hemoglobin 12.2 g/dL      Hematocrit 37.9 %      MCV 85.7 fL      MCH 27.6 pg      MCHC 32.2 g/dL      RDW 13.8 %      Platelets 214 K/uL      MPV 11.3 fL      Differential Type Auto     nRBC 0.0 %      Immature Granulocytes 0.3 %      Neutrophils 32.5 %      Lymphocytes 55.4 %      Monocytes 11.5 %      Eosinophils 0.0 %      Basophils 0.3 %      Immature Granulocytes, Absolute 0.01 K/uL      Neutrophils, Absolute 1.05 K/uL      Lymphocytes, Absolute 1.79 K/uL      Monocytes, Absolute 0.37 K/uL      Eosinophils, Absolute 0.00 K/uL      Basophils, Absolute 0.01 K/uL      PLT Morphology Normal     Platelet Estimate Adequate (150,000-400,000)    Comprehensive metabolic panel [[id number]]  (Abnormal) Collected: 01/23/24 2156    Specimen: Blood, Venous Updated: 01/23/24 2233     Sodium 133 mEQ/L      Potassium 3.7 mEQ/L      Comment: Results obtained on plasma. Plasma Potassium values may be up to 0.4 mEQ/L less than serum values. The differences may be greater for patients with high platelet or white cell counts.        Chloride 101 mEQ/L      CO2 26 mEQ/L      BUN 8 mg/dL      Creatinine 0.7 mg/dL      Glucose 105 mg/dL      Calcium 9.1 mg/dL      AST (SGOT) 15 IU/L      " ALT (SGPT) 9 IU/L      Alkaline Phosphatase 34 IU/L      Total Protein 7.9 g/dL      Comment: Test performed on plasma which typically contains approximately 0.4 g/dL more protein than serum.        Albumin 4.2 g/dL      Bilirubin, Total 0.3 mg/dL      eGFR >60.0 mL/min/1.73m*2      Comment: Calculation based on the Chronic Kidney Disease Epidemiology Collaboration (CKD-EPI) equation refit without adjustment for race.        Anion Gap 6 mEQ/L           Imaging Results    None         ECG 12 lead          Scoring tools                                  ED Course & MDM   MDM / ED COURSE / CLINICAL IMPRESSION / DISPO     Medical Decision Making  Influenza B: acute illness or injury  Amount and/or Complexity of Data Reviewed  Labs: ordered. Decision-making details documented in ED Course.  ECG/medicine tests: ordered.      Risk  OTC drugs.          ED Course as of 01/24/24 0400 Wed Jan 24, 2024 0310 hCG Quant(!): 14,493.0  Pt stating that she is \"about 2 months pregnant\", has not seen OB in office yet but has appointment. She has no pelvic pain, cramping, vaginal bleeding or discharge. LMP 11/24 (8w5d) She will follow-up with her OB  [KM]   0312 WBC(!): 3.23 [KM]   0312 Temp: 37.6 °C (99.7 °F) [KM]   0312 Influenza B(!): Positive [KM]   0357 Pt declined tylenol. Tolerating PO with no vomiting. Vitals stable. Not toxic or ill appearing, lungs CTAB. She was advised follow-up with her PCP and OB and understands to RTER with any worsening symptoms  [KM]      ED Course User Index  [KM] Marcy Clemons PA C     Clinical Impression      Influenza B     _________________     ED Disposition   Discharge                   Marcy Clemons PA C  01/24/24 0400    "

## 2024-01-24 NOTE — LETTER
January 24, 2024    Patient: Angella Whiting   YOB: 1995   Date of Visit: 1/23/2024       To Whom It May Concern:    Angella Whiting was seen and treated in our emergency department on 1/23/2024. She may return on  1/31/2024.    If you have any questions or concerns, please don't hesitate to call.

## 2024-01-24 NOTE — ED ATTESTATION NOTE
The patient was evaluated and managed by the physician assistant / nurse practitioner.       Vladimir Beyer DO  01/24/24 0583

## 2024-03-03 ENCOUNTER — APPOINTMENT (EMERGENCY)
Dept: RADIOLOGY | Facility: HOSPITAL | Age: 29
End: 2024-03-03

## 2024-03-03 ENCOUNTER — HOSPITAL ENCOUNTER (EMERGENCY)
Facility: HOSPITAL | Age: 29
Discharge: HOME | End: 2024-03-03
Attending: EMERGENCY MEDICINE | Admitting: EMERGENCY MEDICINE
Payer: COMMERCIAL

## 2024-03-03 VITALS
SYSTOLIC BLOOD PRESSURE: 125 MMHG | HEIGHT: 66 IN | HEART RATE: 62 BPM | TEMPERATURE: 98.2 F | BODY MASS INDEX: 28.56 KG/M2 | RESPIRATION RATE: 16 BRPM | OXYGEN SATURATION: 94 % | WEIGHT: 177.7 LBS | DIASTOLIC BLOOD PRESSURE: 85 MMHG
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
## 2024-03-03 LAB
B-HCG UR QL: NEGATIVE
POCT TEST: NORMAL

## 2024-03-03 PROCEDURE — G1004 CDSM NDSC: HCPCS

## 2024-03-03 PROCEDURE — 72100 X-RAY EXAM L-S SPINE 2/3 VWS: CPT

## 2024-03-03 PROCEDURE — 99284 EMERGENCY DEPT VISIT MOD MDM: CPT | Mod: 25

## 2024-03-03 PROCEDURE — 63700000 HC SELF-ADMINISTRABLE DRUG: Performed by: PHYSICIAN ASSISTANT

## 2024-03-03 PROCEDURE — 72125 CT NECK SPINE W/O DYE: CPT | Mod: ME

## 2024-03-03 PROCEDURE — 72070 X-RAY EXAM THORAC SPINE 2VWS: CPT

## 2024-03-03 RX ORDER — CYCLOBENZAPRINE HCL 10 MG
10 TABLET ORAL ONCE
Status: COMPLETED | OUTPATIENT
Start: 2024-03-03 | End: 2024-03-03

## 2024-03-03 RX ORDER — IBUPROFEN 600 MG/1
600 TABLET ORAL ONCE
Status: COMPLETED | OUTPATIENT
Start: 2024-03-03 | End: 2024-03-03

## 2024-03-03 RX ORDER — CYCLOBENZAPRINE HCL 10 MG
10 TABLET ORAL 2 TIMES DAILY PRN
Qty: 10 TABLET | Refills: 0 | Status: SHIPPED | OUTPATIENT
Start: 2024-03-03 | End: 2024-03-03

## 2024-03-03 RX ORDER — CYCLOBENZAPRINE HCL 10 MG
10 TABLET ORAL 2 TIMES DAILY PRN
Qty: 10 TABLET | Refills: 0 | Status: SHIPPED | OUTPATIENT
Start: 2024-03-03 | End: 2024-03-17

## 2024-03-03 RX ADMIN — CYCLOBENZAPRINE 10 MG: 10 TABLET, FILM COATED ORAL at 20:11

## 2024-03-03 RX ADMIN — IBUPROFEN 600 MG: 600 TABLET, FILM COATED ORAL at 20:12

## 2024-03-03 NOTE — Clinical Note
Angella Whiting was seen and treated in our emergency department on 3/3/2024.  Angella Whiting may return to work on 03/05/2024.       If you have any questions or concerns, please don't hesitate to call.      Sharon Pinon PA C
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
## 2024-03-05 NOTE — ED ATTESTATION NOTE
The patient was evaluated and managed by the physician assistant / nurse practitioner.       Kamaljit Hooks MD  03/05/24 0714

## 2024-07-31 ENCOUNTER — HOSPITAL ENCOUNTER (EMERGENCY)
Facility: HOSPITAL | Age: 29
Discharge: HOME | End: 2024-07-31
Attending: EMERGENCY MEDICINE | Admitting: EMERGENCY MEDICINE

## 2024-07-31 VITALS
OXYGEN SATURATION: 100 % | BODY MASS INDEX: 28.93 KG/M2 | TEMPERATURE: 98.2 F | WEIGHT: 180 LBS | DIASTOLIC BLOOD PRESSURE: 77 MMHG | SYSTOLIC BLOOD PRESSURE: 117 MMHG | HEART RATE: 79 BPM | RESPIRATION RATE: 16 BRPM | HEIGHT: 66 IN

## 2024-07-31 DIAGNOSIS — T76.21XA SUSPECTED ADULT SEXUAL ABUSE, INITIAL ENCOUNTER: Primary | ICD-10-CM

## 2024-07-31 LAB
ALBUMIN SERPL-MCNC: 4.5 G/DL (ref 3.5–5.7)
ALP SERPL-CCNC: 44 IU/L (ref 34–125)
ALT SERPL-CCNC: 12 IU/L (ref 7–52)
ANION GAP SERPL CALC-SCNC: 8 MEQ/L (ref 3–15)
AST SERPL-CCNC: 17 IU/L (ref 13–39)
BASOPHILS # BLD: 0.05 K/UL (ref 0.01–0.1)
BASOPHILS NFR BLD: 0.9 %
BILIRUB SERPL-MCNC: 0.6 MG/DL (ref 0.3–1.2)
BUN SERPL-MCNC: 11 MG/DL (ref 7–25)
BV WHIFF TEST VAG QL: NORMAL
CALCIUM SERPL-MCNC: 9.3 MG/DL (ref 8.6–10.3)
CHLORIDE SERPL-SCNC: 102 MEQ/L (ref 98–107)
CLUE CELLS SPEC QL WET PREP: NORMAL
CO2 SERPL-SCNC: 27 MEQ/L (ref 21–31)
CREAT SERPL-MCNC: 0.7 MG/DL (ref 0.6–1.2)
DIFFERENTIAL METHOD BLD: NORMAL
EGFRCR SERPLBLD CKD-EPI 2021: >60 ML/MIN/1.73M*2
EOSINOPHIL # BLD: 0.11 K/UL (ref 0.04–0.36)
EOSINOPHIL NFR BLD: 1.9 %
ERYTHROCYTE [DISTWIDTH] IN BLOOD BY AUTOMATED COUNT: 13 % (ref 11.7–14.4)
GLUCOSE SERPL-MCNC: 86 MG/DL (ref 70–99)
HCG SERPL-ACNC: 1 IU/L (MIU/ML)
HCT VFR BLD AUTO: 41.3 % (ref 35–45)
HGB BLD-MCNC: 13.5 G/DL (ref 11.8–15.7)
IMM GRANULOCYTES # BLD AUTO: 0.01 K/UL (ref 0–0.08)
IMM GRANULOCYTES NFR BLD AUTO: 0.2 %
LYMPHOCYTES # BLD: 2.39 K/UL (ref 1.2–3.5)
LYMPHOCYTES NFR BLD: 41 %
MCH RBC QN AUTO: 28.6 PG (ref 28–33.2)
MCHC RBC AUTO-ENTMCNC: 32.7 G/DL (ref 32.2–35.5)
MCV RBC AUTO: 87.5 FL (ref 83–98)
MONOCYTES # BLD: 0.44 K/UL (ref 0.28–0.8)
MONOCYTES NFR BLD: 7.5 %
NEUTROPHILS # BLD: 2.83 K/UL (ref 1.7–7)
NEUTS SEG NFR BLD: 48.5 %
NRBC BLD-RTO: 0 %
PDW BLD AUTO: 11.1 FL (ref 9.4–12.3)
PLATELET # BLD AUTO: 294 K/UL (ref 150–369)
POTASSIUM SERPL-SCNC: 4 MEQ/L (ref 3.5–5.1)
PROT SERPL-MCNC: 8.6 G/DL (ref 6–8.2)
RBC # BLD AUTO: 4.72 M/UL (ref 3.93–5.22)
SODIUM SERPL-SCNC: 137 MEQ/L (ref 136–145)
T VAGINALIS GENITAL QL WET PREP: NORMAL
WBC # BLD AUTO: 5.83 K/UL (ref 3.8–10.5)
WBC VAG QL WET PREP: NORMAL
YEAST VAG QL WET PREP: NORMAL

## 2024-07-31 PROCEDURE — 87210 SMEAR WET MOUNT SALINE/INK: CPT

## 2024-07-31 PROCEDURE — 87591 N.GONORRHOEAE DNA AMP PROB: CPT

## 2024-07-31 PROCEDURE — 63600000 HC DRUGS/DETAIL CODE: Mod: JZ

## 2024-07-31 PROCEDURE — 84702 CHORIONIC GONADOTROPIN TEST: CPT

## 2024-07-31 PROCEDURE — 63700000 HC SELF-ADMINISTRABLE DRUG

## 2024-07-31 PROCEDURE — 99284 EMERGENCY DEPT VISIT MOD MDM: CPT

## 2024-07-31 PROCEDURE — 85025 COMPLETE CBC W/AUTO DIFF WBC: CPT

## 2024-07-31 PROCEDURE — 3E02329 INTRODUCTION OF OTHER ANTI-INFECTIVE INTO MUSCLE, PERCUTANEOUS APPROACH: ICD-10-PCS | Performed by: EMERGENCY MEDICINE

## 2024-07-31 PROCEDURE — 25000000 HC PHARMACY GENERAL

## 2024-07-31 PROCEDURE — 87389 HIV-1 AG W/HIV-1&-2 AB AG IA: CPT

## 2024-07-31 PROCEDURE — 96372 THER/PROPH/DIAG INJ SC/IM: CPT

## 2024-07-31 PROCEDURE — 80053 COMPREHEN METABOLIC PANEL: CPT

## 2024-07-31 PROCEDURE — 86592 SYPHILIS TEST NON-TREP QUAL: CPT

## 2024-07-31 PROCEDURE — 36415 COLL VENOUS BLD VENIPUNCTURE: CPT

## 2024-07-31 PROCEDURE — 87491 CHLMYD TRACH DNA AMP PROBE: CPT

## 2024-07-31 PROCEDURE — 80074 ACUTE HEPATITIS PANEL: CPT

## 2024-07-31 RX ORDER — ONDANSETRON 4 MG/1
4 TABLET, ORALLY DISINTEGRATING ORAL ONCE
Status: COMPLETED | OUTPATIENT
Start: 2024-07-31 | End: 2024-07-31

## 2024-07-31 RX ORDER — AZITHROMYCIN 250 MG/1
1000 TABLET, FILM COATED ORAL ONCE
Status: COMPLETED | OUTPATIENT
Start: 2024-07-31 | End: 2024-07-31

## 2024-07-31 RX ORDER — LEVONORGESTREL 1.5 MG/1
1.5 TABLET ORAL ONCE
Status: COMPLETED | OUTPATIENT
Start: 2024-07-31 | End: 2024-07-31

## 2024-07-31 RX ADMIN — LEVONORGESTREL 1.5 MG: 1.5 TABLET ORAL at 18:53

## 2024-07-31 RX ADMIN — ONDANSETRON 4 MG: 4 TABLET, ORALLY DISINTEGRATING ORAL at 18:11

## 2024-07-31 RX ADMIN — AZITHROMYCIN 1000 MG: 250 TABLET, FILM COATED ORAL at 18:28

## 2024-07-31 RX ADMIN — LIDOCAINE HYDROCHLORIDE 500 MG: 10 SOLUTION INTRAVENOUS at 18:28

## 2024-07-31 NOTE — ED NOTES
Pt was uncomfortable taking her discharge papers so she took a picture so she could review her results online and asked me to shred them for her. Patient appeared fine. Patient was not in any distress. Patient stated she would be driving herself home.      Kaylin Noguera, ANTONIETA  07/31/24 7139

## 2024-07-31 NOTE — DISCHARGE INSTRUCTIONS
You may follow along with your lab results in your health portal  We will call if there are any positive or concerning results    Per your request, you were treated for gonorrhea and chlamydia only today.
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
## 2024-08-01 LAB
C TRACH RRNA SPEC QL NAA+PROBE: NEGATIVE
HAV IGM SER QL: NONREACTIVE
HBV CORE IGM SER QL: NONREACTIVE
HBV SURFACE AG SER QL: NONREACTIVE
HCV AB SER QL: NONREACTIVE
HIV 1+2 AB+HIV1 P24 AG SERPL QL IA: NONREACTIVE
N GONORRHOEA RRNA SPEC QL NAA+PROBE: NEGATIVE
RPR SER QL: NORMAL

## 2024-11-13 ENCOUNTER — APPOINTMENT (EMERGENCY)
Dept: RADIOLOGY | Facility: HOSPITAL | Age: 29
End: 2024-11-13

## 2024-11-13 ENCOUNTER — HOSPITAL ENCOUNTER (EMERGENCY)
Facility: HOSPITAL | Age: 29
Discharge: HOME | End: 2024-11-13
Attending: EMERGENCY MEDICINE | Admitting: EMERGENCY MEDICINE

## 2024-11-13 VITALS
HEART RATE: 79 BPM | RESPIRATION RATE: 18 BRPM | DIASTOLIC BLOOD PRESSURE: 81 MMHG | OXYGEN SATURATION: 100 % | SYSTOLIC BLOOD PRESSURE: 127 MMHG | TEMPERATURE: 98.1 F

## 2024-11-13 DIAGNOSIS — S93.602A FOOT SPRAIN, LEFT, INITIAL ENCOUNTER: Primary | ICD-10-CM

## 2024-11-13 PROCEDURE — 73630 X-RAY EXAM OF FOOT: CPT | Mod: LT

## 2024-11-13 PROCEDURE — 2W3RX1Z IMMOBILIZATION OF LEFT LOWER LEG USING SPLINT: ICD-10-PCS | Performed by: EMERGENCY MEDICINE

## 2024-11-13 PROCEDURE — 63700000 HC SELF-ADMINISTRABLE DRUG: Performed by: PHYSICIAN ASSISTANT

## 2024-11-13 PROCEDURE — 99283 EMERGENCY DEPT VISIT LOW MDM: CPT | Mod: 25

## 2024-11-13 PROCEDURE — 73610 X-RAY EXAM OF ANKLE: CPT | Mod: LT

## 2024-11-13 PROCEDURE — 29515 APPLICATION SHORT LEG SPLINT: CPT | Mod: LT

## 2024-11-13 RX ORDER — NAPROXEN 500 MG/1
500 TABLET ORAL 2 TIMES DAILY WITH MEALS
Qty: 20 TABLET | Refills: 0 | Status: SHIPPED | OUTPATIENT
Start: 2024-11-13

## 2024-11-13 RX ORDER — IBUPROFEN 600 MG/1
600 TABLET ORAL ONCE
Status: COMPLETED | OUTPATIENT
Start: 2024-11-13 | End: 2024-11-13

## 2024-11-13 RX ADMIN — IBUPROFEN 600 MG: 600 TABLET, FILM COATED ORAL at 14:02

## 2024-11-13 NOTE — Clinical Note
Angella Whiting was seen and treated in our emergency department on 11/13/2024.  Angella Whiting may return to work on 11/15/2024.       If you have any questions or concerns, please don't hesitate to call.      Sharon Pinon PA C

## 2024-11-13 NOTE — ED ATTESTATION NOTE
The patient was evaluated and managed by the physician assistant / nurse practitioner.     Brandan Interiano MD  11/13/24 9675

## 2024-11-13 NOTE — ED PROVIDER NOTES
Emergency Medicine Note  HPI   HISTORY OF PRESENT ILLNESS     29-year-old female here for fall injury that occurred around 6 AM.  Patient reports that she tripped on the last few steps and rolled her left foot and ankle.  Difficulty bearing weight secondary to pain.  Did not take anything for pain prior to arrival.  Report swelling.  No numbness or tingling or associated injuries.  Is not on any blood thinners.      Trauma  Mechanism of injury: Fall         Patient History   PAST HISTORY     Reviewed from Nursing Triage:       No past medical history on file.    Past Surgical History   Procedure Laterality Date    Combined augmentation mammaplasty and abdominoplasty         No family history on file.    Social History     Tobacco Use    Smoking status: Never    Smokeless tobacco: Never   Substance Use Topics    Alcohol use: Never    Drug use: Never         Review of Systems   REVIEW OF SYSTEMS     Review of Systems      VITALS     ED Vitals      Date/Time Temp Pulse Resp BP SpO2 [facility]   11/13/24 1354 -- 79 18 127/81 100 % FR   11/13/24 1145 36.7 °C (98.1 °F) 86 18 142/82 99 % JLM                         Physical Exam   PHYSICAL EXAM     Physical Exam  Vitals and nursing note reviewed.   Constitutional:       General: She is not in acute distress.     Appearance: Normal appearance. She is normal weight. She is not ill-appearing, toxic-appearing or diaphoretic.   HENT:      Mouth/Throat:      Mouth: Mucous membranes are moist.   Cardiovascular:      Rate and Rhythm: Normal rate.   Pulmonary:      Effort: Pulmonary effort is normal. No respiratory distress.   Musculoskeletal:         General: Swelling and tenderness present. Normal range of motion.      Comments: Mild tenderness to dorsal aspect of left foot and ankle.  There is minimal swelling.  No redness or warmth or bruising.  Strong pedal pulse.   Skin:     General: Skin is warm.      Findings: No erythema.   Neurological:      General: No focal deficit present.       Mental Status: She is alert and oriented to person, place, and time.      Sensory: No sensory deficit.           PROCEDURES     Splint Application    Date/Time: 11/13/2024 2:14 PM    Performed by: Sharon Pinon PA C  Authorized by: Brandan Interiano MD    Consent:     Consent obtained:  Verbal    Consent given by:  Patient  Universal protocol:     Imaging studies available: yes      Patient identity confirmed:  Arm band  Procedure details:     Immobilization:  Crutches and splint    Splint type:  Ankle stirrup  Post-procedure details:     Distal perfusion: distal pulses strong and brisk capillary refill      Procedure completion:  Tolerated well, no immediate complications       DATA     Results       None            Imaging Results              X-RAY FOOT LEFT 3+ VIEWS (Final result)  Result time 11/13/24 12:08:57      Final result                   Impression:    IMPRESSION:  No fracture or dislocation.                 Narrative:    CLINICAL HISTORY:   Fall, foot and ankle pain and swelling.    COMMENT: Four views of the left foot and four views of the left ankle were  obtained.    There is no evidence of fracture, dislocation or destructive process.  The soft  tissues are normal.  Ankle mortise is not widened.                                       X-RAY ANKLE LEFT 3+ VIEWS (Final result)  Result time 11/13/24 12:08:45      Final result                   Impression:    IMPRESSION:  No fracture or dislocation.                 Narrative:    CLINICAL HISTORY:   Fall, foot and ankle pain and swelling.    COMMENT: Four views of the left foot and four views of the left ankle were  obtained.    There is no evidence of fracture, dislocation or destructive process.  The soft  tissues are normal.  Ankle mortise is not widened.                                      No orders to display       Scoring tools                                  ED Course & MDM   MDM / ED COURSE / CLINICAL IMPRESSION / DISPO     Medical Decision  Making  Problems Addressed:  Foot sprain, left, initial encounter: acute illness or injury    Amount and/or Complexity of Data Reviewed  Radiology: ordered and independent interpretation performed. Decision-making details documented in ED Course.     Details: X-ray(s) visualized by me, my independent interpretation:  no fx    Risk  Prescription drug management.           Clinical Impression      Foot sprain, left, initial encounter     _________________       ED Disposition   Discharge                       Sharon Pinon PA C  11/13/24 1410